# Patient Record
Sex: MALE | Race: WHITE | NOT HISPANIC OR LATINO | URBAN - METROPOLITAN AREA
[De-identification: names, ages, dates, MRNs, and addresses within clinical notes are randomized per-mention and may not be internally consistent; named-entity substitution may affect disease eponyms.]

---

## 2017-01-07 ENCOUNTER — EMERGENCY (EMERGENCY)
Facility: HOSPITAL | Age: 32
LOS: 1 days | Discharge: HOME | End: 2017-01-07
Admitting: EMERGENCY MEDICINE
Payer: COMMERCIAL

## 2017-01-07 PROCEDURE — 99283 EMERGENCY DEPT VISIT LOW MDM: CPT | Mod: 25

## 2017-01-07 PROCEDURE — 99283 EMERGENCY DEPT VISIT LOW MDM: CPT

## 2017-01-07 PROCEDURE — 72040 X-RAY EXAM NECK SPINE 2-3 VW: CPT

## 2017-01-07 PROCEDURE — 72040 X-RAY EXAM NECK SPINE 2-3 VW: CPT | Mod: 26

## 2017-01-07 RX ORDER — METHOCARBAMOL 500 MG/1
2 TABLET, FILM COATED ORAL
Qty: 30 | Refills: 0 | OUTPATIENT
Start: 2017-01-07 | End: 2017-01-12

## 2017-03-28 ENCOUNTER — APPOINTMENT (OUTPATIENT)
Dept: GASTROENTEROLOGY | Facility: CLINIC | Age: 32
End: 2017-03-28

## 2017-03-28 VITALS
TEMPERATURE: 98 F | BODY MASS INDEX: 24.48 KG/M2 | HEART RATE: 68 BPM | SYSTOLIC BLOOD PRESSURE: 112 MMHG | DIASTOLIC BLOOD PRESSURE: 80 MMHG | OXYGEN SATURATION: 99 % | HEIGHT: 67 IN | RESPIRATION RATE: 15 BRPM | WEIGHT: 156 LBS

## 2017-03-30 LAB
25(OH)D3 SERPL-MCNC: 36.2 NG/ML
ALBUMIN SERPL ELPH-MCNC: 4.1 G/DL
ALP BLD-CCNC: 59 U/L
ALT SERPL-CCNC: 38 U/L
ANION GAP SERPL CALC-SCNC: 18 MMOL/L
AST SERPL-CCNC: 43 U/L
BASOPHILS # BLD AUTO: 0.06 K/UL
BASOPHILS NFR BLD AUTO: 0.5 %
BILIRUB SERPL-MCNC: <0.2 MG/DL
BUN SERPL-MCNC: 37 MG/DL
CALCIUM SERPL-MCNC: 9.4 MG/DL
CHLORIDE SERPL-SCNC: 101 MMOL/L
CO2 SERPL-SCNC: 21 MMOL/L
CREAT SERPL-MCNC: 1.18 MG/DL
CRP SERPL-MCNC: 0.2 MG/DL
EOSINOPHIL # BLD AUTO: 0.46 K/UL
EOSINOPHIL NFR BLD AUTO: 4.2 %
GLUCOSE SERPL-MCNC: 87 MG/DL
HCT VFR BLD CALC: 40.4 %
HGB BLD-MCNC: 13.5 G/DL
IMM GRANULOCYTES NFR BLD AUTO: 0.1 %
LYMPHOCYTES # BLD AUTO: 2.01 K/UL
LYMPHOCYTES NFR BLD AUTO: 18.4 %
MAN DIFF?: NORMAL
MCHC RBC-ENTMCNC: 30 PG
MCHC RBC-ENTMCNC: 33.4 GM/DL
MCV RBC AUTO: 89.8 FL
MONOCYTES # BLD AUTO: 0.44 K/UL
MONOCYTES NFR BLD AUTO: 4 %
NEUTROPHILS # BLD AUTO: 7.95 K/UL
NEUTROPHILS NFR BLD AUTO: 72.8 %
PLATELET # BLD AUTO: 249 K/UL
POTASSIUM SERPL-SCNC: 4.3 MMOL/L
PROT SERPL-MCNC: 7.1 G/DL
RBC # BLD: 4.5 M/UL
RBC # FLD: 13.3 %
SODIUM SERPL-SCNC: 140 MMOL/L
WBC # FLD AUTO: 10.93 K/UL

## 2017-04-05 ENCOUNTER — MESSAGE (OUTPATIENT)
Age: 32
End: 2017-04-05

## 2017-04-05 ENCOUNTER — APPOINTMENT (OUTPATIENT)
Dept: CARDIOLOGY | Facility: CLINIC | Age: 32
End: 2017-04-05

## 2017-04-05 ENCOUNTER — NON-APPOINTMENT (OUTPATIENT)
Age: 32
End: 2017-04-05

## 2017-04-05 VITALS
DIASTOLIC BLOOD PRESSURE: 76 MMHG | BODY MASS INDEX: 24.23 KG/M2 | WEIGHT: 154.4 LBS | OXYGEN SATURATION: 97 % | HEIGHT: 67 IN | HEART RATE: 57 BPM | SYSTOLIC BLOOD PRESSURE: 125 MMHG | TEMPERATURE: 98.3 F

## 2017-04-05 DIAGNOSIS — K62.5 HEMORRHAGE OF ANUS AND RECTUM: ICD-10-CM

## 2017-04-05 DIAGNOSIS — R74.8 ABNORMAL LEVELS OF OTHER SERUM ENZYMES: ICD-10-CM

## 2017-04-06 LAB
C DIFF TOX GENS STL QL NAA+PROBE: NORMAL
CDIFF BY PCR: NOT DETECTED

## 2017-04-07 LAB — DEPRECATED O AND P PREP STL: NORMAL

## 2017-06-22 ENCOUNTER — APPOINTMENT (OUTPATIENT)
Dept: CARDIOLOGY | Facility: CLINIC | Age: 32
End: 2017-06-22

## 2017-06-22 ENCOUNTER — NON-APPOINTMENT (OUTPATIENT)
Age: 32
End: 2017-06-22

## 2017-06-22 VITALS
SYSTOLIC BLOOD PRESSURE: 113 MMHG | BODY MASS INDEX: 25.43 KG/M2 | WEIGHT: 162 LBS | HEIGHT: 67 IN | HEART RATE: 63 BPM | DIASTOLIC BLOOD PRESSURE: 64 MMHG | OXYGEN SATURATION: 96 %

## 2017-06-22 DIAGNOSIS — R05 COUGH: ICD-10-CM

## 2017-06-22 DIAGNOSIS — R06.2 WHEEZING: ICD-10-CM

## 2017-07-05 ENCOUNTER — OUTPATIENT (OUTPATIENT)
Dept: OUTPATIENT SERVICES | Facility: HOSPITAL | Age: 32
LOS: 1 days | End: 2017-07-05
Payer: COMMERCIAL

## 2017-07-05 ENCOUNTER — APPOINTMENT (OUTPATIENT)
Dept: RADIOLOGY | Facility: IMAGING CENTER | Age: 32
End: 2017-07-05

## 2017-07-05 ENCOUNTER — MESSAGE (OUTPATIENT)
Age: 32
End: 2017-07-05

## 2017-07-05 DIAGNOSIS — Z00.8 ENCOUNTER FOR OTHER GENERAL EXAMINATION: ICD-10-CM

## 2017-07-05 PROCEDURE — 71046 X-RAY EXAM CHEST 2 VIEWS: CPT

## 2017-07-06 ENCOUNTER — MESSAGE (OUTPATIENT)
Age: 32
End: 2017-07-06

## 2017-07-13 ENCOUNTER — APPOINTMENT (OUTPATIENT)
Dept: GASTROENTEROLOGY | Facility: HOSPITAL | Age: 32
End: 2017-07-13

## 2017-07-13 ENCOUNTER — OUTPATIENT (OUTPATIENT)
Dept: OUTPATIENT SERVICES | Facility: HOSPITAL | Age: 32
LOS: 1 days | Discharge: HOME | End: 2017-07-13
Payer: COMMERCIAL

## 2017-07-13 ENCOUNTER — RESULT REVIEW (OUTPATIENT)
Age: 32
End: 2017-07-13

## 2017-07-13 DIAGNOSIS — K51.90 ULCERATIVE COLITIS, UNSPECIFIED, WITHOUT COMPLICATIONS: ICD-10-CM

## 2017-07-13 PROCEDURE — 45380 COLONOSCOPY AND BIOPSY: CPT | Mod: GC

## 2017-07-13 PROCEDURE — 88305 TISSUE EXAM BY PATHOLOGIST: CPT | Mod: 26

## 2017-07-17 LAB — SURGICAL PATHOLOGY STUDY: SIGNIFICANT CHANGE UP

## 2017-08-15 ENCOUNTER — MEDICATION RENEWAL (OUTPATIENT)
Age: 32
End: 2017-08-15

## 2017-08-15 RX ORDER — POLYETHYLENE GLYCOL 3350, SODIUM SULFATE, SODIUM CHLORIDE, POTASSIUM CHLORIDE, ASCORBIC ACID, SODIUM ASCORBATE 7.5-2.691G
100 KIT ORAL
Qty: 1 | Refills: 0 | Status: COMPLETED | COMMUNITY
Start: 2017-05-05 | End: 2017-08-15

## 2017-08-21 ENCOUNTER — RX RENEWAL (OUTPATIENT)
Age: 32
End: 2017-08-21

## 2017-09-21 ENCOUNTER — APPOINTMENT (OUTPATIENT)
Dept: CARDIOLOGY | Facility: CLINIC | Age: 32
End: 2017-09-21

## 2017-09-21 RX ORDER — CALCIPOTRIENE 50 UG/G
0.01 CREAM TOPICAL
Qty: 120 | Refills: 0 | Status: ACTIVE | COMMUNITY
Start: 2017-07-24

## 2017-09-21 RX ORDER — HALOBETASOL PROPIONATE 0.5 MG/G
0.05 CREAM TOPICAL
Qty: 50 | Refills: 0 | Status: ACTIVE | COMMUNITY
Start: 2017-07-24

## 2017-11-08 ENCOUNTER — OTHER (OUTPATIENT)
Age: 32
End: 2017-11-08

## 2017-11-08 ENCOUNTER — MESSAGE (OUTPATIENT)
Age: 32
End: 2017-11-08

## 2017-11-12 ENCOUNTER — MOBILE ON CALL (OUTPATIENT)
Age: 32
End: 2017-11-12

## 2017-11-14 LAB
25(OH)D3 SERPL-MCNC: 35.1 NG/ML
ALBUMIN SERPL ELPH-MCNC: 4.3 G/DL
ALP BLD-CCNC: 68 U/L
ALT SERPL-CCNC: 25 U/L
ANION GAP SERPL CALC-SCNC: 15 MMOL/L
AST SERPL-CCNC: 39 U/L
BACTERIA STL CULT: NORMAL
BASOPHILS # BLD AUTO: 0.06 K/UL
BASOPHILS NFR BLD AUTO: 0.7 %
BILIRUB SERPL-MCNC: 0.3 MG/DL
BUN SERPL-MCNC: 38 MG/DL
CALCIUM SERPL-MCNC: 9.6 MG/DL
CHLORIDE SERPL-SCNC: 96 MMOL/L
CO2 SERPL-SCNC: 22 MMOL/L
CREAT SERPL-MCNC: 1.3 MG/DL
CRP SERPL-MCNC: 0.6 MG/DL
DEPRECATED O AND P PREP STL: NORMAL
EOSINOPHIL # BLD AUTO: 0.15 K/UL
EOSINOPHIL NFR BLD AUTO: 1.7 %
HCT VFR BLD CALC: 41.4 %
HGB BLD-MCNC: 13.8 G/DL
IMM GRANULOCYTES NFR BLD AUTO: 0.1 %
LYMPHOCYTES # BLD AUTO: 1.38 K/UL
LYMPHOCYTES NFR BLD AUTO: 15.2 %
MAN DIFF?: NORMAL
MCHC RBC-ENTMCNC: 28.5 PG
MCHC RBC-ENTMCNC: 33.3 GM/DL
MCV RBC AUTO: 85.5 FL
MONOCYTES # BLD AUTO: 0.75 K/UL
MONOCYTES NFR BLD AUTO: 8.3 %
NEUTROPHILS # BLD AUTO: 6.71 K/UL
NEUTROPHILS NFR BLD AUTO: 74 %
PLATELET # BLD AUTO: 237 K/UL
POTASSIUM SERPL-SCNC: 3.7 MMOL/L
PROT SERPL-MCNC: 7.5 G/DL
RBC # BLD: 4.84 M/UL
RBC # FLD: 14.1 %
SODIUM SERPL-SCNC: 133 MMOL/L
WBC # FLD AUTO: 9.06 K/UL

## 2017-11-17 LAB — CALPROTECTIN FECAL: 98 UG/G

## 2017-11-18 LAB
C DIFF TOX GENS STL QL NAA+PROBE: NORMAL
CDIFF BY PCR: NOT DETECTED

## 2018-05-22 ENCOUNTER — RX RENEWAL (OUTPATIENT)
Age: 33
End: 2018-05-22

## 2018-05-30 ENCOUNTER — MEDICATION RENEWAL (OUTPATIENT)
Age: 33
End: 2018-05-30

## 2018-05-31 ENCOUNTER — MESSAGE (OUTPATIENT)
Age: 33
End: 2018-05-31

## 2018-06-29 ENCOUNTER — LABORATORY RESULT (OUTPATIENT)
Age: 33
End: 2018-06-29

## 2018-06-29 ENCOUNTER — APPOINTMENT (OUTPATIENT)
Dept: GASTROENTEROLOGY | Facility: CLINIC | Age: 33
End: 2018-06-29
Payer: COMMERCIAL

## 2018-06-29 VITALS
WEIGHT: 160 LBS | OXYGEN SATURATION: 99 % | HEART RATE: 63 BPM | BODY MASS INDEX: 25.11 KG/M2 | DIASTOLIC BLOOD PRESSURE: 82 MMHG | HEIGHT: 67 IN | SYSTOLIC BLOOD PRESSURE: 128 MMHG

## 2018-06-29 PROCEDURE — 99214 OFFICE O/P EST MOD 30 MIN: CPT | Mod: 25

## 2018-06-29 PROCEDURE — 36415 COLL VENOUS BLD VENIPUNCTURE: CPT

## 2018-07-02 ENCOUNTER — APPOINTMENT (OUTPATIENT)
Dept: PULMONOLOGY | Facility: CLINIC | Age: 33
End: 2018-07-02
Payer: COMMERCIAL

## 2018-07-02 VITALS
BODY MASS INDEX: 25.11 KG/M2 | HEIGHT: 67 IN | DIASTOLIC BLOOD PRESSURE: 58 MMHG | HEART RATE: 58 BPM | SYSTOLIC BLOOD PRESSURE: 100 MMHG | OXYGEN SATURATION: 97 % | WEIGHT: 160 LBS

## 2018-07-02 PROCEDURE — 99213 OFFICE O/P EST LOW 20 MIN: CPT | Mod: 25

## 2018-07-02 PROCEDURE — 94060 EVALUATION OF WHEEZING: CPT

## 2018-07-02 RX ORDER — FLUTICASONE PROPIONATE 50 UG/1
50 SPRAY, METERED NASAL DAILY
Qty: 1 | Refills: 3 | Status: ACTIVE | COMMUNITY
Start: 2018-07-02 | End: 1900-01-01

## 2018-07-06 LAB
25(OH)D3 SERPL-MCNC: 36.8 NG/ML
ALBUMIN SERPL ELPH-MCNC: 4.2 G/DL
ALP BLD-CCNC: 73 U/L
ALT SERPL-CCNC: 19 U/L
ANION GAP SERPL CALC-SCNC: 13 MMOL/L
AST SERPL-CCNC: 29 U/L
BASOPHILS # BLD AUTO: 0.1 K/UL
BASOPHILS NFR BLD AUTO: 1.3 %
BILIRUB SERPL-MCNC: 0.2 MG/DL
BUN SERPL-MCNC: 35 MG/DL
CALCIUM SERPL-MCNC: 9.4 MG/DL
CHLORIDE SERPL-SCNC: 103 MMOL/L
CO2 SERPL-SCNC: 23 MMOL/L
CREAT SERPL-MCNC: 1.18 MG/DL
CRP SERPL-MCNC: 0.7 MG/DL
EOSINOPHIL # BLD AUTO: 0.34 K/UL
EOSINOPHIL NFR BLD AUTO: 4.4 %
HCT VFR BLD CALC: 38.5 %
HGB BLD-MCNC: 12.3 G/DL
IMM GRANULOCYTES NFR BLD AUTO: 0.1 %
LYMPHOCYTES # BLD AUTO: 1.38 K/UL
LYMPHOCYTES NFR BLD AUTO: 18 %
MAN DIFF?: NORMAL
MCHC RBC-ENTMCNC: 27 PG
MCHC RBC-ENTMCNC: 31.9 GM/DL
MCV RBC AUTO: 84.6 FL
MONOCYTES # BLD AUTO: 0.68 K/UL
MONOCYTES NFR BLD AUTO: 8.9 %
NEUTROPHILS # BLD AUTO: 5.17 K/UL
NEUTROPHILS NFR BLD AUTO: 67.3 %
PLATELET # BLD AUTO: 328 K/UL
POTASSIUM SERPL-SCNC: 4.5 MMOL/L
PROT SERPL-MCNC: 7.5 G/DL
RBC # BLD: 4.55 M/UL
RBC # FLD: 14.4 %
SODIUM SERPL-SCNC: 139 MMOL/L
WBC # FLD AUTO: 7.68 K/UL

## 2018-08-05 ENCOUNTER — RX RENEWAL (OUTPATIENT)
Age: 33
End: 2018-08-05

## 2018-10-15 ENCOUNTER — APPOINTMENT (OUTPATIENT)
Dept: CARDIOLOGY | Facility: CLINIC | Age: 33
End: 2018-10-15
Payer: COMMERCIAL

## 2018-10-15 ENCOUNTER — NON-APPOINTMENT (OUTPATIENT)
Age: 33
End: 2018-10-15

## 2018-10-15 VITALS
HEIGHT: 67 IN | OXYGEN SATURATION: 98 % | SYSTOLIC BLOOD PRESSURE: 133 MMHG | DIASTOLIC BLOOD PRESSURE: 80 MMHG | WEIGHT: 156 LBS | HEART RATE: 65 BPM | BODY MASS INDEX: 24.48 KG/M2

## 2018-10-15 DIAGNOSIS — R09.82 POSTNASAL DRIP: ICD-10-CM

## 2018-10-15 PROCEDURE — 90471 IMMUNIZATION ADMIN: CPT

## 2018-10-15 PROCEDURE — 90682 RIV4 VACC RECOMBINANT DNA IM: CPT

## 2018-10-15 PROCEDURE — 99395 PREV VISIT EST AGE 18-39: CPT | Mod: 25

## 2018-10-17 LAB
25(OH)D3 SERPL-MCNC: 36.6 NG/ML
ALBUMIN SERPL ELPH-MCNC: 4.8 G/DL
ALP BLD-CCNC: 81 U/L
ALT SERPL-CCNC: 28 U/L
ANION GAP SERPL CALC-SCNC: 23 MMOL/L
AST SERPL-CCNC: 37 U/L
BASOPHILS # BLD AUTO: 0.04 K/UL
BASOPHILS NFR BLD AUTO: 0.5 %
BILIRUB SERPL-MCNC: 0.3 MG/DL
BUN SERPL-MCNC: 38 MG/DL
CALCIUM SERPL-MCNC: 9.9 MG/DL
CHLORIDE SERPL-SCNC: 100 MMOL/L
CHOLEST SERPL-MCNC: 258 MG/DL
CHOLEST/HDLC SERPL: 3.7 RATIO
CO2 SERPL-SCNC: 20 MMOL/L
CREAT SERPL-MCNC: 1.18 MG/DL
EOSINOPHIL # BLD AUTO: 0.05 K/UL
EOSINOPHIL NFR BLD AUTO: 0.6 %
ESTIMATED AVERAGE GLUCOSE: 120 MG/DL
FERRITIN SERPL-MCNC: 18 NG/ML
GLUCOSE SERPL-MCNC: 93 MG/DL
HBA1C MFR BLD HPLC: 5.8 %
HCT VFR BLD CALC: 46 %
HDLC SERPL-MCNC: 70 MG/DL
HGB BLD-MCNC: 14.7 G/DL
IMM GRANULOCYTES NFR BLD AUTO: 0.1 %
IRON SATN MFR SERPL: 24 %
IRON SERPL-MCNC: 86 UG/DL
LDLC SERPL CALC-MCNC: 172 MG/DL
LYMPHOCYTES # BLD AUTO: 1.35 K/UL
LYMPHOCYTES NFR BLD AUTO: 16.6 %
MAN DIFF?: NORMAL
MCHC RBC-ENTMCNC: 26.5 PG
MCHC RBC-ENTMCNC: 32 GM/DL
MCV RBC AUTO: 82.9 FL
MONOCYTES # BLD AUTO: 0.42 K/UL
MONOCYTES NFR BLD AUTO: 5.2 %
NEUTROPHILS # BLD AUTO: 6.24 K/UL
NEUTROPHILS NFR BLD AUTO: 77 %
PLATELET # BLD AUTO: 277 K/UL
POTASSIUM SERPL-SCNC: 4.3 MMOL/L
PROT SERPL-MCNC: 8.4 G/DL
RBC # BLD: 5.55 M/UL
RBC # FLD: 15.1 %
SODIUM SERPL-SCNC: 143 MMOL/L
T4 SERPL-MCNC: 7.3 UG/DL
TIBC SERPL-MCNC: 365 UG/DL
TRIGL SERPL-MCNC: 80 MG/DL
TSH SERPL-ACNC: 1.19 UIU/ML
UIBC SERPL-MCNC: 279 UG/DL
WBC # FLD AUTO: 8.11 K/UL

## 2018-12-27 ENCOUNTER — MEDICATION RENEWAL (OUTPATIENT)
Age: 33
End: 2018-12-27

## 2018-12-27 ENCOUNTER — OTHER (OUTPATIENT)
Age: 33
End: 2018-12-27

## 2019-01-02 ENCOUNTER — APPOINTMENT (OUTPATIENT)
Dept: GASTROENTEROLOGY | Facility: CLINIC | Age: 34
End: 2019-01-02

## 2019-01-02 ENCOUNTER — OTHER (OUTPATIENT)
Age: 34
End: 2019-01-02

## 2019-06-20 ENCOUNTER — TRANSCRIPTION ENCOUNTER (OUTPATIENT)
Age: 34
End: 2019-06-20

## 2019-08-15 ENCOUNTER — MEDICATION RENEWAL (OUTPATIENT)
Age: 34
End: 2019-08-15

## 2019-08-15 RX ORDER — MESALAMINE 1000 MG/1
1000 SUPPOSITORY RECTAL
Qty: 90 | Refills: 3 | Status: COMPLETED | COMMUNITY
Start: 2017-03-28 | End: 2019-08-15

## 2019-10-16 ENCOUNTER — APPOINTMENT (OUTPATIENT)
Dept: GASTROENTEROLOGY | Facility: CLINIC | Age: 34
End: 2019-10-16
Payer: COMMERCIAL

## 2019-10-16 VITALS
BODY MASS INDEX: 25.11 KG/M2 | HEIGHT: 67 IN | HEART RATE: 57 BPM | WEIGHT: 160 LBS | OXYGEN SATURATION: 89 % | SYSTOLIC BLOOD PRESSURE: 130 MMHG | DIASTOLIC BLOOD PRESSURE: 82 MMHG

## 2019-10-16 PROCEDURE — 36415 COLL VENOUS BLD VENIPUNCTURE: CPT

## 2019-10-16 PROCEDURE — 99214 OFFICE O/P EST MOD 30 MIN: CPT | Mod: 25

## 2019-10-16 NOTE — HISTORY OF PRESENT ILLNESS
[FreeTextEntry1] : Office revisit on 10/16/19.\par \par Previously evaluated for office consultation on 8/3/16.\par \par INITIAL CONSULTATION NOTE:\par \par The patient is a 31-year-old male referred for ongoing treatment of ulcerative colitis. The patient's wife was in attendance throughout the interview and examination. The history was from the patient and a review of limited records that were provided. PMD: Dr. Parish Willingham.\par \par Patient indicates onset of GI symptoms in approximately 2009. Experienced complaints of bloating, cramps, diarrhea and abdominal discomfort. Reports undergoing a colonoscopy in 2009 at [facility] by Dr. Steve Dacosta. He reports this diagnostic evaluation was negative. He indicates being evaluated for celiac disease with apparent negative results. He does not recall if he underwent a colonoscopy.\par \par Several years ago he underwent evaluation by Dr. Parish Holden of colorectal surgery. It is not clear if a sigmoidoscopy was performed. The patient was experiencing rectal bleeding at that time. The patient indicates being diagnosed with possibly hemorrhoids or an anal fissure.\par \par The patient moved to [place] in 8/2015. His symptoms of abdominal gaseous discomfort, diarrhea and rectal bleeding persisted. He was experiencing approximately 6-7 bowel movements especially in the morning. Some movements were small volume and just passage of blood. He was ultimately evaluated by the [facility] in [place]. He was evaluated initially by colorectal surgery and subsequently by gastroenterology.\par \par A colonoscopy was performed by Dr. Raheel Phillips on 9/17/15 revealing localized mild inflammation in the rectum. Examination was otherwise normal. The pathology report regarding this is not available.\par \par Flexible sigmoidoscopy was performed on 12/15/15 revealing inflammatory changes in the rectum. Proctitis was diagnosed.\par \par In 12/2015 the patient was hospitalized for one day at the [facility] facility in [place]. He indicates being diagnosed with ulcerative colitis. 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 Balsalazide 3 tabs p.o. t.i.d. was prescribed. Of note, the patient misunderstood the directions and only took one tab t.i.d. Canasa suppository was prescribed for one to 2 months. He was treated by Dr. April Villalpando of gastroenterology.\par \par Overall, the patient derived a significant improvement in symptoms. However, it is not clear if all of his symptoms resolved and whether he was in a complete remission.\par \par The patient stopped all of the treatment on his own some time in 12/2015 or early 1/2016. He moved back to [place].\par \par In 6/2016 he describes a flare. Diarrhea, abdominal cramps, abdominal gaseous discomfort,bloating and rectal bleeding recurred. Indicates up to 20 "bowel movements" a day although he was experiencing tenesmus and some of these were just small evacuations of blood. There was no fever, nausea or vomiting. He lost 6 pounds.\par \par The patient resumed  balsalazide one tab t.i.d. on his own. He did not resume rectal therapy. He indicates a significant improvement. However, he remains somewhat symptomatic. He has approximately 4-5 bowel movements daily that very in consistency. Occasionally has loose stools. Can still observe some blood.\par \par The patient's appetite and weight are stable. He denies any current complaints of fever, oral ulcers, dysphagia, heartburn, nausea, vomiting or abdominal pain.\par \par The patient denies any history of gallstones or kidney stones.\par \par The patient has a history of psoriasis. However, he reports no symptoms suggestive of skin, joint or ocular extraintestinal manifestations. However, he can experience occasional hip pain.\par \par The patient exercises a lot. He can experience a sense of straining at the umbilicus. However, there is no bulge or obvious hernia. He describes a "tugging feeling" at the umbilicus with a pulling sensation.\par \par The patient indicates on a few occasions he was found to have elevated liver enzymes. He reports no etiology was determined. He indicates this was transient on 2 occasions with subsequent normalization.\par \par CURRENT HISTORY:\par \par ORV 3/28/17:        -Had been doing quite well on regimen of balsalazide 4 tabs p.o. b.i.d. in conjunction with Canasa 1 g daily.\par                 -Change in insurance 1/2017 resulted in discontinuation of Canasa. After one week he experienced flare of colitis-like symptoms. Currently having 4-5 bowel movements daily of varying consistency. Some loose and some formed. Occasional blood observed.\par                -Denies fever, nausea, vomiting or abdominal pain. Appetite fair. Has lost 4 pounds.\par                -Patient still utilizing balsalazide 4 tabs p.o. b.i.d.\par                -Has occasional headaches.\par                 -Increased stress. He has had to move.\par                 -Other than Canasa discontinuation patient reports no other precipitating factors such as antibiotics, travel, diet changes etc.\par \par COLONOSCOPY 7/13/17:    -Total colonoscopy was performed to the cecum with ileoscopy on 7/13/17. Macroscopically the colon was normal except for minimal erythema in the rectum and sigmoid colon. There was no ulcerations, friability or other features of active colitis. Vascular pattern was  preserved. Biopsy of the ascending colon was noted for nonspecific inflammation in the lamina propria. Biopsies of the transverse colon, descending colon, sigmoid colon and rectum revealed colonic mucosa with chronic inflammatory changes characterized by crypt distortion and basal lymphoplasmacytosis and focal activity. Dysplasia was not detected.\par \par ORV 6/29/18:    -Overall, doing well from the standpoint of ulcerative proctitis over the past year. For the most part has been asymptomatic and in remission. However, patient indicates that 3 weeks ago he experienced symptoms lasting one week. For several days he described increased frequency of moving his bowels with up to 12 diarrheal evacuations daily with some blood. Patient suspects dietary factors with increased high-fiber foods may have been provocative. No sick contacts, travel, antibiotics or other precipitating factors reported. Medication regimen was not adjusted during this interval. Symptoms lasted approximately one week and he has since recovered.\par                -Patient currently has 2 formed bowel movements daily without complaints of diarrhea or constipation. Significantly feels better from approximately 3 weeks ago. Might notice some scant blood. However, much improved. Denies fever, nausea, vomiting or abdominal pain.\par               -Medications include balsalazide 4 tabs b.i.d. and Canasa suppository 1 g h.s.\par \par ORV 10/16/19:     -Patient presents for followup regarding history of ulcerative proctosigmoiditis. Dominant clinical features have been mostly ulcerative proctitis. Currently doing well on regimen of balsalazide 4 tabs p.o. b.i.d. in conjunction with mesalamine enema 1 g h.s. Typically has 2 formed bowel movements in the morning and denies current complaints of diarrhea, constipation, change in bowel habit or rectal bleeding. On occasion can have mild constipation.

## 2019-10-16 NOTE — CONSULT LETTER
[Dear  ___] : Dear  [unfilled], [Courtesy Letter:] : I had the pleasure of seeing your patient, [unfilled], in my office today. [Please see my note below.] : Please see my note below. [Consult Closing:] : Thank you very much for allowing me to participate in the care of this patient.  If you have any questions, please do not hesitate to contact me. [Sincerely,] : Sincerely, [Rashad Fernandez M.D.] : Rashad Fernandez M.D. [Division of Gastroenterology] : Division of Gastroenterology [[facility]] : [facility] [[address] : [address] [[place] [address]] : [place] [address] [FreeTextEntry2] : Dr. Parish Willingham [FreeTextEntry3] : Rashad Fernandez M.D.

## 2019-10-16 NOTE — PHYSICAL EXAM
[General Appearance - Alert] : alert [General Appearance - In No Acute Distress] : in no acute distress [General Appearance - Well Nourished] : well nourished [General Appearance - Well Developed] : well developed [General Appearance - Well-Appearing] : healthy appearing [Sclera] : the sclera and conjunctiva were normal [Oropharynx] : the oropharynx was normal [Neck Appearance] : the appearance of the neck was normal [Neck Cervical Mass (___cm)] : no neck mass was observed [Respiration, Rhythm And Depth] : normal respiratory rhythm and effort [Exaggerated Use Of Accessory Muscles For Inspiration] : no accessory muscle use [Auscultation Breath Sounds / Voice Sounds] : lungs were clear to auscultation bilaterally [Heart Rate And Rhythm] : heart rate was normal and rhythm regular [Heart Sounds] : normal S1 and S2 [Heart Sounds Gallop] : no gallops [Murmurs] : no murmurs [Heart Sounds Pericardial Friction Rub] : no pericardial rub [Edema] : there was no peripheral edema [Bowel Sounds] : normal bowel sounds [Abdomen Soft] : soft [Abdomen Tenderness] : non-tender [] : no hepato-splenomegaly [Abdomen Mass (___ Cm)] : no abdominal mass palpated [Abdomen Hernia] : no hernia was discovered [Cervical Lymph Nodes Enlarged Posterior Bilaterally] : posterior cervical [Cervical Lymph Nodes Enlarged Anterior Bilaterally] : anterior cervical [Supraclavicular Lymph Nodes Enlarged Bilaterally] : supraclavicular [Abnormal Walk] : normal gait [Nail Clubbing] : no clubbing  or cyanosis of the fingernails [Skin Color & Pigmentation] : normal skin color and pigmentation [Oriented To Time, Place, And Person] : oriented to person, place, and time [Impaired Insight] : insight and judgment were intact [Affect] : the affect was normal [Mood] : the mood was normal [FreeTextEntry1] : Pectus excavatum is noted.

## 2019-10-16 NOTE — ASSESSMENT
[FreeTextEntry1] : Impression:\par \par #1 ulcerative proctitis- overall doing well on combination therapy with balsalazide 4 tabs p.o. b.i.d. and mesalamine suppository 1 g q.d. Currently stable and in clinical remission.\par \par #2 status post RIH repair\par \par #3 psoriasis.

## 2019-10-22 LAB
ALBUMIN SERPL ELPH-MCNC: 4.9 G/DL
ALP BLD-CCNC: 83 U/L
ALT SERPL-CCNC: 26 U/L
ANION GAP SERPL CALC-SCNC: 16 MMOL/L
AST SERPL-CCNC: 36 U/L
BASOPHILS # BLD AUTO: 0.09 K/UL
BASOPHILS NFR BLD AUTO: 1.2 %
BILIRUB SERPL-MCNC: 0.3 MG/DL
BUN SERPL-MCNC: 27 MG/DL
CALCIUM SERPL-MCNC: 9.8 MG/DL
CHLORIDE SERPL-SCNC: 103 MMOL/L
CO2 SERPL-SCNC: 22 MMOL/L
CREAT SERPL-MCNC: 0.98 MG/DL
EOSINOPHIL # BLD AUTO: 0.16 K/UL
EOSINOPHIL NFR BLD AUTO: 2.1 %
ERYTHROCYTE [SEDIMENTATION RATE] IN BLOOD BY WESTERGREN METHOD: 30 MM/HR
FERRITIN SERPL-MCNC: 30 NG/ML
HCT VFR BLD CALC: 43.6 %
HGB BLD-MCNC: 13.7 G/DL
IMM GRANULOCYTES NFR BLD AUTO: 0.3 %
IRON SATN MFR SERPL: 21 %
IRON SERPL-MCNC: 79 UG/DL
LYMPHOCYTES # BLD AUTO: 1.8 K/UL
LYMPHOCYTES NFR BLD AUTO: 23.8 %
MAN DIFF?: NORMAL
MCHC RBC-ENTMCNC: 27.8 PG
MCHC RBC-ENTMCNC: 31.4 GM/DL
MCV RBC AUTO: 88.6 FL
MONOCYTES # BLD AUTO: 0.53 K/UL
MONOCYTES NFR BLD AUTO: 7 %
NEUTROPHILS # BLD AUTO: 4.95 K/UL
NEUTROPHILS NFR BLD AUTO: 65.6 %
PLATELET # BLD AUTO: 303 K/UL
POTASSIUM SERPL-SCNC: 4.3 MMOL/L
PROT SERPL-MCNC: 7.8 G/DL
RBC # BLD: 4.92 M/UL
RBC # FLD: 13.2 %
SODIUM SERPL-SCNC: 141 MMOL/L
TIBC SERPL-MCNC: 368 UG/DL
UIBC SERPL-MCNC: 289 UG/DL
WBC # FLD AUTO: 7.55 K/UL

## 2020-04-15 ENCOUNTER — APPOINTMENT (OUTPATIENT)
Dept: GASTROENTEROLOGY | Facility: CLINIC | Age: 35
End: 2020-04-15

## 2020-04-20 ENCOUNTER — APPOINTMENT (OUTPATIENT)
Dept: GASTROENTEROLOGY | Facility: CLINIC | Age: 35
End: 2020-04-20
Payer: COMMERCIAL

## 2020-04-20 PROCEDURE — 99213 OFFICE O/P EST LOW 20 MIN: CPT | Mod: 95

## 2020-04-20 NOTE — HISTORY OF PRESENT ILLNESS
[Medical Office: ([facility])___] : at the medical office located in  [Home] : at home, [unfilled] , at the time of the visit. [Patient] : the patient [Self] : self [FreeTextEntry1] : Telehealth visit on 4/20/2020.\par \par Patient presents for followup regarding history of ulcerative colitis.\par \par Previously evaluated for office consultation on 8/3/16.\par \par INITIAL CONSULTATION NOTE:\par \par The patient is a 31-year-old male referred for ongoing treatment of ulcerative colitis. The patient's wife was in attendance throughout the interview and examination. The history was from the patient and a review of limited records that were provided. PMD: Dr. Parish Willingham.\par \par Patient indicates onset of GI symptoms in approximately 2009. Experienced complaints of bloating, cramps, diarrhea and abdominal discomfort. Reports undergoing a colonoscopy in 2009 at [facility] by Dr. Steve Dacosta. He reports this diagnostic evaluation was negative. He indicates being evaluated for celiac disease with apparent negative results. He does not recall if he underwent a colonoscopy.\par \par Several years ago he underwent evaluation by Dr. Parish Holden of colorectal surgery. It is not clear if a sigmoidoscopy was performed. The patient was experiencing rectal bleeding at that time. The patient indicates being diagnosed with possibly hemorrhoids or an anal fissure.\par \par The patient moved to [place] in 8/2015. His symptoms of abdominal gaseous discomfort, diarrhea and rectal bleeding persisted. He was experiencing approximately 6-7 bowel movements especially in the morning. Some movements were small volume and just passage of blood. He was ultimately evaluated by the [facility] in [place]. He was evaluated initially by colorectal surgery and subsequently by gastroenterology.\par \par A colonoscopy was performed by Dr. Raheel Phillips on 9/17/15 revealing localized mild inflammation in the rectum. Examination was otherwise normal. The pathology report regarding this is not available.\par \par Flexible sigmoidoscopy was performed on 12/15/15 revealing inflammatory changes in the rectum. Proctitis was diagnosed.\par \par In 12/2015 the patient was hospitalized for one day at the [facility] facility in [place]. He indicates being diagnosed with ulcerative colitis. 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 Balsalazide 3 tabs p.o. t.i.d. was prescribed. Of note, the patient misunderstood the directions and only took one tab t.i.d. Canasa suppository was prescribed for one to 2 months. He was treated by Dr. April Villalpando of gastroenterology.\par \par Overall, the patient derived a significant improvement in symptoms. However, it is not clear if all of his symptoms resolved and whether he was in a complete remission.\par \par The patient stopped all of the treatment on his own some time in 12/2015 or early 1/2016. He moved back to [place].\par \par In 6/2016 he describes a flare. Diarrhea, abdominal cramps, abdominal gaseous discomfort,bloating and rectal bleeding recurred. Indicates up to 20 "bowel movements" a day although he was experiencing tenesmus and some of these were just small evacuations of blood. There was no fever, nausea or vomiting. He lost 6 pounds.\par \par The patient resumed  balsalazide one tab t.i.d. on his own. He did not resume rectal therapy. He indicates a significant improvement. However, he remains somewhat symptomatic. He has approximately 4-5 bowel movements daily that very in consistency. Occasionally has loose stools. Can still observe some blood.\par \par The patient's appetite and weight are stable. He denies any current complaints of fever, oral ulcers, dysphagia, heartburn, nausea, vomiting or abdominal pain.\par \par The patient denies any history of gallstones or kidney stones.\par \par The patient has a history of psoriasis. However, he reports no symptoms suggestive of skin, joint or ocular extraintestinal manifestations. However, he can experience occasional hip pain.\par \par The patient exercises a lot. He can experience a sense of straining at the umbilicus. However, there is no bulge or obvious hernia. He describes a "tugging feeling" at the umbilicus with a pulling sensation.\par \par The patient indicates on a few occasions he was found to have elevated liver enzymes. He reports no etiology was determined. He indicates this was transient on 2 occasions with subsequent normalization.\par \par CURRENT HISTORY:\par \par ORV 3/28/17:        -Had been doing quite well on regimen of balsalazide 4 tabs p.o. b.i.d. in conjunction with Canasa 1 g daily.\par                 -Change in insurance 1/2017 resulted in discontinuation of Canasa. After one week he experienced flare of colitis-like symptoms. Currently having 4-5 bowel movements daily of varying consistency. Some loose and some formed. Occasional blood observed.\par                -Denies fever, nausea, vomiting or abdominal pain. Appetite fair. Has lost 4 pounds.\par                -Patient still utilizing balsalazide 4 tabs p.o. b.i.d.\par                -Has occasional headaches.\par                 -Increased stress. He has had to move.\par                 -Other than Canasa discontinuation patient reports no other precipitating factors such as antibiotics, travel, diet changes etc.\par \par COLONOSCOPY 7/13/17:    -Total colonoscopy was performed to the cecum with ileoscopy on 7/13/17. Macroscopically the colon was normal except for minimal erythema in the rectum and sigmoid colon. There was no ulcerations, friability or other features of active colitis. Vascular pattern was  preserved. Biopsy of the ascending colon was noted for nonspecific inflammation in the lamina propria. Biopsies of the transverse colon, descending colon, sigmoid colon and rectum revealed colonic mucosa with chronic inflammatory changes characterized by crypt distortion and basal lymphoplasmacytosis and focal activity. Dysplasia was not detected.\par \par ORV 6/29/18:    -Overall, doing well from the standpoint of ulcerative proctitis over the past year. For the most part has been asymptomatic and in remission. However, patient indicates that 3 weeks ago he experienced symptoms lasting one week. For several days he described increased frequency of moving his bowels with up to 12 diarrheal evacuations daily with some blood. Patient suspects dietary factors with increased high-fiber foods may have been provocative. No sick contacts, travel, antibiotics or other precipitating factors reported. Medication regimen was not adjusted during this interval. Symptoms lasted approximately one week and he has since recovered.\par                -Patient currently has 2 formed bowel movements daily without complaints of diarrhea or constipation. Significantly feels better from approximately 3 weeks ago. Might notice some scant blood. However, much improved. Denies fever, nausea, vomiting or abdominal pain.\par               -Medications include balsalazide 4 tabs b.i.d. and Canasa suppository 1 g h.s.\par \par ORV 10/16/19:     -Patient presents for followup regarding history of ulcerative proctosigmoiditis. Dominant clinical features have been mostly ulcerative proctitis. Currently doing well on regimen of balsalazide 4 tabs p.o. b.i.d. in conjunction with mesalamine enema 1 g h.s. Typically has 2 formed bowel movements in the morning and denies current complaints of diarrhea, constipation, change in bowel habit or rectal bleeding. On occasion can have mild constipation.\par \par Telehealth visit 4/20/2020:     -Patient with history of ulcerative colitis (primarily ulcerative proctitis). Disease duration approximately 5-6 years. Currently on regimen of balsalazide 4 tabs p.o. b.i.d. and mesalamine suppository 1 g q.h.s. Doing well at current time. Has one formed bowel movement daily without any current complaints of diarrhea, constipation or rectal bleeding. Previously could have occasional scant rectal bleeding but none recently. Denies fever, nausea, vomiting or abdominal pain. Currently working at home during the coronavirus epidemic. Some increased stress reported.

## 2020-04-20 NOTE — ASSESSMENT
[FreeTextEntry1] : Impression:\par \par #1 ulcerative proctitis- overall doing well on combination therapy with balsalazide 4 tabs p.o. b.i.d. and mesalamine suppository 1 g q.d. History of ulcerative proctitis diagnosis since 2015. Currently stable and in clinical remission.\par \par #2 status post RIH repair\par \par #3 psoriasis.

## 2020-04-20 NOTE — CONSULT LETTER
[Dear  ___] : Dear  [unfilled], [Courtesy Letter:] : I had the pleasure of seeing your patient, [unfilled], in my office today. [Consult Closing:] : Thank you very much for allowing me to participate in the care of this patient.  If you have any questions, please do not hesitate to contact me. [Please see my note below.] : Please see my note below. [Sincerely,] : Sincerely, [FreeTextEntry3] : Rashad Fernandez M.D. [FreeTextEntry2] : Dr. Parish Willingham

## 2020-08-17 RX ORDER — MESALAMINE 1000 MG/1
1000 SUPPOSITORY RECTAL
Qty: 90 | Refills: 3 | Status: ACTIVE | COMMUNITY
Start: 2019-08-15 | End: 1900-01-01

## 2021-02-17 ENCOUNTER — NON-APPOINTMENT (OUTPATIENT)
Age: 36
End: 2021-02-17

## 2021-03-31 ENCOUNTER — NON-APPOINTMENT (OUTPATIENT)
Age: 36
End: 2021-03-31

## 2021-03-31 ENCOUNTER — RX RENEWAL (OUTPATIENT)
Age: 36
End: 2021-03-31

## 2021-05-19 ENCOUNTER — APPOINTMENT (OUTPATIENT)
Dept: CARDIOLOGY | Facility: CLINIC | Age: 36
End: 2021-05-19
Payer: COMMERCIAL

## 2021-05-19 DIAGNOSIS — E55.9 VITAMIN D DEFICIENCY, UNSPECIFIED: ICD-10-CM

## 2021-05-19 DIAGNOSIS — R19.7 DIARRHEA, UNSPECIFIED: ICD-10-CM

## 2021-05-19 DIAGNOSIS — Z00.00 ENCOUNTER FOR GENERAL ADULT MEDICAL EXAMINATION W/OUT ABNORMAL FINDINGS: ICD-10-CM

## 2021-05-19 DIAGNOSIS — E78.00 PURE HYPERCHOLESTEROLEMIA, UNSPECIFIED: ICD-10-CM

## 2021-05-19 PROCEDURE — 99213 OFFICE O/P EST LOW 20 MIN: CPT | Mod: 95

## 2021-05-19 NOTE — REASON FOR VISIT
[FreeTextEntry1] : VIDEO VISIT\par \par TeleHealth Video Encounter:\par \par Initiated by:\par __Patient Call\par _X_Patient Election for TeleHealth visit\par \par Consented for TeleHealth visit\par Patient Location:  Home\par Physician Location:\par _X_Office([address]; [address], [address], [place], [place] [address])\par __Home\par \par Narrative: The patient feels generally well.  He has no new specific complaints.  He denies any chest pains, shortness of breath, or palpitations.  He is treating his colitis which is stable.  He has remained active and is not taking supplements.  He gets no symptoms with his purposeful exercise.  He has had some iron deficiency anemia in the past.  He will be changing jobs and may need blood test for those jobs.  He has not had blood tests in a while.  He is following up with his gastroenterologist about the colitis.  He does not require any prescriptions at the current time.\par \par Assessment: Stable colitis on current medications.  History of iron deficiency anemia.\par \par Plan: Routine blood tests.\par \par \par Follow-up: In 1 year or earlier if needed.\par \par  \par Duration of Encounter:  22  minutes, at least 50% of which was spent in direct counseling and coordination of care\par \par \par \par

## 2021-05-19 NOTE — PHYSICAL EXAM
[General Appearance - Well Developed] : well developed [Normal Appearance] : normal appearance [Well Groomed] : well groomed [General Appearance - Well Nourished] : well nourished [No Deformities] : no deformities [General Appearance - In No Acute Distress] : no acute distress [Normal Conjunctiva] : the conjunctiva exhibited no abnormalities [Eyelids - No Xanthelasma] : the eyelids demonstrated no xanthelasmas [Normal Oral Mucosa] : normal oral mucosa [No Oral Pallor] : no oral pallor [No Oral Cyanosis] : no oral cyanosis [Normal Jugular Venous A Waves Present] : normal jugular venous A waves present [Normal Jugular Venous V Waves Present] : normal jugular venous V waves present [No Jugular Venous Vega A Waves] : no jugular venous vega A waves [Respiration, Rhythm And Depth] : normal respiratory rhythm and effort [Exaggerated Use Of Accessory Muscles For Inspiration] : no accessory muscle use [Edema] : no peripheral edema present [Auscultation Breath Sounds / Voice Sounds] : lungs were clear to auscultation bilaterally [Bowel Sounds] : normal bowel sounds [Abnormal Walk] : normal gait [Gait - Sufficient For Exercise Testing] : the gait was sufficient for exercise testing [Nail Clubbing] : no clubbing of the fingernails [Cyanosis, Localized] : no localized cyanosis [Petechial Hemorrhages (___cm)] : no petechial hemorrhages [] : no ischemic changes [No Xanthoma] : no  xanthoma was observed [Oriented To Time, Place, And Person] : oriented to person, place, and time [Affect] : the affect was normal [No Anxiety] : not feeling anxious [Mood] : the mood was normal [FreeTextEntry1] : psoriatic patches on both elbows

## 2021-07-19 ENCOUNTER — NON-APPOINTMENT (OUTPATIENT)
Age: 36
End: 2021-07-19

## 2021-07-19 ENCOUNTER — RX RENEWAL (OUTPATIENT)
Age: 36
End: 2021-07-19

## 2021-08-27 ENCOUNTER — EMERGENCY (EMERGENCY)
Facility: HOSPITAL | Age: 36
LOS: 1 days | Discharge: HOME | End: 2021-08-27
Attending: EMERGENCY MEDICINE
Payer: COMMERCIAL

## 2021-08-27 VITALS
DIASTOLIC BLOOD PRESSURE: 74 MMHG | SYSTOLIC BLOOD PRESSURE: 117 MMHG | TEMPERATURE: 99 F | RESPIRATION RATE: 18 BRPM | HEART RATE: 66 BPM | OXYGEN SATURATION: 100 %

## 2021-08-27 VITALS
OXYGEN SATURATION: 98 % | HEIGHT: 67 IN | DIASTOLIC BLOOD PRESSURE: 77 MMHG | RESPIRATION RATE: 18 BRPM | SYSTOLIC BLOOD PRESSURE: 118 MMHG | TEMPERATURE: 97 F | HEART RATE: 75 BPM | WEIGHT: 160.06 LBS

## 2021-08-27 LAB
HCT VFR BLD CALC: 36.4 % — LOW (ref 39–50)
HGB BLD-MCNC: 12.1 G/DL — LOW (ref 13–17)
MCHC RBC-ENTMCNC: 28.3 PG — SIGNIFICANT CHANGE UP (ref 27–34)
MCHC RBC-ENTMCNC: 33.2 GM/DL — SIGNIFICANT CHANGE UP (ref 32–36)
MCV RBC AUTO: 85 FL — SIGNIFICANT CHANGE UP (ref 80–100)
NRBC # BLD: 0 /100 WBCS — SIGNIFICANT CHANGE UP (ref 0–0)
PLATELET # BLD AUTO: 294 K/UL — SIGNIFICANT CHANGE UP (ref 150–400)
RAPID RVP RESULT: SIGNIFICANT CHANGE UP
RBC # BLD: 4.28 M/UL — SIGNIFICANT CHANGE UP (ref 4.2–5.8)
RBC # FLD: 12.2 % — SIGNIFICANT CHANGE UP (ref 10.3–14.5)
SARS-COV-2 RNA SPEC QL NAA+PROBE: SIGNIFICANT CHANGE UP
WBC # BLD: 8.7 K/UL — SIGNIFICANT CHANGE UP (ref 3.8–10.5)
WBC # FLD AUTO: 8.7 K/UL — SIGNIFICANT CHANGE UP (ref 3.8–10.5)

## 2021-08-27 PROCEDURE — 93010 ELECTROCARDIOGRAM REPORT: CPT | Mod: NC

## 2021-08-27 PROCEDURE — 71045 X-RAY EXAM CHEST 1 VIEW: CPT

## 2021-08-27 PROCEDURE — 94640 AIRWAY INHALATION TREATMENT: CPT

## 2021-08-27 PROCEDURE — 93005 ELECTROCARDIOGRAM TRACING: CPT

## 2021-08-27 PROCEDURE — 71045 X-RAY EXAM CHEST 1 VIEW: CPT | Mod: 26

## 2021-08-27 PROCEDURE — 85027 COMPLETE CBC AUTOMATED: CPT

## 2021-08-27 PROCEDURE — 0225U NFCT DS DNA&RNA 21 SARSCOV2: CPT

## 2021-08-27 PROCEDURE — 99284 EMERGENCY DEPT VISIT MOD MDM: CPT

## 2021-08-27 PROCEDURE — 99284 EMERGENCY DEPT VISIT MOD MDM: CPT | Mod: 25

## 2021-08-27 RX ORDER — ALBUTEROL 90 UG/1
1 AEROSOL, METERED ORAL ONCE
Refills: 0 | Status: COMPLETED | OUTPATIENT
Start: 2021-08-27 | End: 2021-08-27

## 2021-08-27 RX ADMIN — ALBUTEROL 1 PUFF(S): 90 AEROSOL, METERED ORAL at 15:46

## 2021-08-27 NOTE — ED PROVIDER NOTE - PHYSICAL EXAMINATION
GENERAL: NAD, lying in bed comfortably  HEAD:  Atraumatic, Normocephalic  EYES: EOMI, PERRLA, conjunctiva and sclera clear  ENT: Moist mucous membranes  NECK: Supple, No JVD  CHEST/LUNG: Clear to auscultation bilaterally; No rales, rhonchi, wheezing, or rubs. Unlabored respirations, no chest wall tenderness  HEART: Regular rate and rhythm; No murmurs, rubs, or gallops  ABDOMEN: Bowel sounds present; Soft, Nontender, Nondistended. No hepatomegaly  EXTREMITIES:  2+ Peripheral Pulses, brisk capillary refill. No clubbing, cyanosis, or edema  NERVOUS SYSTEM:  Alert & Oriented X3, speech clear. No deficits   MSK: FROM all 4 extremities, full and equal strength  SKIN: No rashes or lesions

## 2021-08-27 NOTE — ED PROVIDER NOTE - CLINICAL SUMMARY MEDICAL DECISION MAKING FREE TEXT BOX
36M PMH UC, no other cardiac risk factors, p/w atypical/noncardiac CP w/cough, possible viral syndrome/COVID, VSS. Labs, RVP, COVID, XR, reassess.

## 2021-08-27 NOTE — ED PROVIDER NOTE - PATIENT PORTAL LINK FT
You can access the FollowMyHealth Patient Portal offered by [facility] by registering at the following website: [email or web address]. By joining [employer]’s FollowMyHealth portal, you will also be able to view your health information using other applications (apps) compatible with our system.

## 2021-08-27 NOTE — ED PROVIDER NOTE - NSFOLLOWUPINSTRUCTIONS_ED_ALL_ED_FT
You presented to the hospital with chest pain and cough. This is likely due to a viral infection, and not due to your heart. You presented to the hospital with chest pain and cough. This is likely due to a viral infection, and not due to your heart. You were found to be mildly anemic. Please follow-up with your primary care doctor. You will be called with the results of the viral and COVID-19 swabs.

## 2021-08-27 NOTE — ED PROVIDER NOTE - CARE PLAN
1 Principal Discharge DX:	Atypical chest pain   Principal Discharge DX:	Atypical chest pain  Secondary Diagnosis:	Cough

## 2021-08-27 NOTE — ED PROVIDER NOTE - PROGRESS NOTE DETAILS
Attending note (Fredis): labs reviewed, mild anemia hgb 12, patient informed (suspect related to underlying crohn's) and patient to f/u with his private physician regarding cbc results (provided to patient)  covid pcr pending, results can be followed up outpatient.  CXR reviewed, no consolidation, effusion or ptx noted. stable for dc.

## 2021-08-27 NOTE — ED ADULT NURSE NOTE - OBJECTIVE STATEMENT
pt male with mid sternal chest discomfort onset 38 hrs ago with productive cough afebrile fully vaccinated no sob skin warm dry EKG NSR pt bilateral clear breath sounds placed on isolation on arrival denies shortness of breath motor sensory intact pt pending md evaluation

## 2021-08-27 NOTE — ED PROVIDER NOTE - OBJECTIVE STATEMENT
36M PMH UC, psoriasis p/w 36M PMH UC, psoriasis p/w chest pain x 2 days. Pt endorses recent seasonal allergies which have caused him to have runny nose and cough x 1 month, but for past 3 days, cough has been worse and has become productive of white/yellow sputum. +Associated central, sharp, nonradiating chest pain worse with inspiration and mildly worse with exertion but does not improve with rest. Denies SOB, fever, chills, travel. S/p both COVID vaccines and neg for COVID last week. 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

## 2021-08-27 NOTE — ED PROVIDER NOTE - NS ED ROS FT
REVIEW OF SYSTEMS:  [ ] Unable to assess ROS because ______  CONSTITUTIONAL: No fever, chills, night sweats, or fatigue  EYES: No eye pain, visual disturbances, or discharge  ENMT:  No difficulty hearing, tinnitus, vertigo; No sinus or throat pain  NECK: No pain or stiffness  BREASTS: No pain, masses, or nipple discharge  RESPIRATORY: No cough, wheezing, or hemoptysis; No shortness of breath  CARDIOVASCULAR: No chest pain, palpitations, dizziness, or leg swelling  GASTROINTESTINAL: No abdominal or epigastric pain. No nausea, vomiting, or hematemesis; No diarrhea or constipation. No melena or hematochezia.  GENITOURINARY: No dysuria, frequency, hematuria, or incontinence  NEUROLOGICAL: No headaches, memory loss, loss of strength, numbness, or tremors  SKIN: No itching, burning, rashes, or lesions   LYMPH NODES: No enlarged glands  ENDOCRINE: No heat or cold intolerance; No hair loss  MUSCULOSKELETAL: No joint pain or swelling; No muscle, back, or extremity pain  PSYCHIATRIC: No depression, anxiety, mood swings, or difficulty sleeping  HEME/LYMPH: No easy bruising, or bleeding gums  ALLERGY AND IMMUNOLOGIC: No hives or eczema    Negative except as per HPI

## 2021-08-27 NOTE — ED PROVIDER NOTE - ATTENDING CONTRIBUTION TO CARE
Patient is a 37 yo M with history of ulcerative colitis, psoriasis here for cough and associated chest pain x 2 days. He states he has a chronic cough with his allergies but is concerned he may have been exposed to COVID through work. He states pain is worse with breathing. No history of DVT/PE. No leg swelling. No fevers, chills, nausea, vomiting or diarrhea. He is vaccinated for COVID but has an infant at home and wants to be careful. Symptoms are sometimes worse with exertion. Cough is productive and worse than normal.     VS noted  Gen. no acute distress, Non toxic   HEENT: EOMI, mmm  Lungs: CTAB/L no C/ W /R   CVS: RRR   Abd; Soft non tender, non distended. no CVA tenderness  Ext: no edema  Skin: no rash  Neuro AAOx3 non focal clear speech  a/p: cough/ chest discomfort - ekg wnl. Plan for CXR, RVP, albuterol inhaler. No suspicion for ACS/PE.   - Shana CABRAL

## 2021-08-30 ENCOUNTER — TRANSCRIPTION ENCOUNTER (OUTPATIENT)
Age: 36
End: 2021-08-30

## 2021-08-31 PROBLEM — L40.9 PSORIASIS, UNSPECIFIED: Chronic | Status: ACTIVE | Noted: 2021-08-27

## 2021-08-31 PROBLEM — K51.90 ULCERATIVE COLITIS, UNSPECIFIED, WITHOUT COMPLICATIONS: Chronic | Status: ACTIVE | Noted: 2021-08-27

## 2021-09-07 ENCOUNTER — APPOINTMENT (OUTPATIENT)
Dept: PULMONOLOGY | Facility: CLINIC | Age: 36
End: 2021-09-07
Payer: COMMERCIAL

## 2021-09-07 VITALS
HEIGHT: 67 IN | TEMPERATURE: 98 F | HEART RATE: 65 BPM | BODY MASS INDEX: 25.11 KG/M2 | OXYGEN SATURATION: 97 % | DIASTOLIC BLOOD PRESSURE: 64 MMHG | SYSTOLIC BLOOD PRESSURE: 133 MMHG | WEIGHT: 160 LBS

## 2021-09-07 DIAGNOSIS — Z01.812 ENCOUNTER FOR PREPROCEDURAL LABORATORY EXAMINATION: ICD-10-CM

## 2021-09-07 DIAGNOSIS — Z20.822 ENCOUNTER FOR PREPROCEDURAL LABORATORY EXAMINATION: ICD-10-CM

## 2021-09-07 DIAGNOSIS — R05 COUGH: ICD-10-CM

## 2021-09-07 PROCEDURE — 99214 OFFICE O/P EST MOD 30 MIN: CPT

## 2021-09-07 RX ORDER — MONTELUKAST 10 MG/1
10 TABLET, FILM COATED ORAL DAILY
Qty: 30 | Refills: 2 | Status: ACTIVE | COMMUNITY
Start: 2021-09-07 | End: 1900-01-01

## 2021-09-07 NOTE — ASSESSMENT
[FreeTextEntry1] : possible allergies triggering RAD?\par trial of singulair daily\par cont prn albuterol\par follow up for pft

## 2021-09-07 NOTE — PROCEDURE
[FreeTextEntry1] : Prior pft reviewed--normal\par \par \par Reviewed:\par \par EXAM: XR CHEST PORTABLE URGENT 1V\par \par PROCEDURE DATE: 08/27/2021\par \par INTERPRETATION: EXAMINATION: XR CHEST URGENT\par \par CLINICAL INDICATION: chest pain\par \par TECHNIQUE: Single frontal, portable view of the chest was obtained.\par \par COMPARISON: Chest x-ray 7/5/2017.\par \par FINDINGS:\par The heart is normal in size.\par The lungs are clear.\par There is no pneumothorax or pleural effusion.\par \par IMPRESSION:\par Clear lungs.\par \par --- End of Report ---

## 2021-09-07 NOTE — HISTORY OF PRESENT ILLNESS
[TextBox_4] : beginning of summer having cough and throat tickle\par some sob\par seems to happen every summer\par 2 weeks ago sob, went to ed, given albuterol and felt better, using about twice daily

## 2021-10-04 ENCOUNTER — APPOINTMENT (OUTPATIENT)
Dept: GASTROENTEROLOGY | Facility: CLINIC | Age: 36
End: 2021-10-04
Payer: COMMERCIAL

## 2021-10-04 VITALS
WEIGHT: 166 LBS | HEART RATE: 64 BPM | BODY MASS INDEX: 26.06 KG/M2 | TEMPERATURE: 98.8 F | DIASTOLIC BLOOD PRESSURE: 70 MMHG | SYSTOLIC BLOOD PRESSURE: 110 MMHG | OXYGEN SATURATION: 98 % | HEIGHT: 67 IN

## 2021-10-04 PROCEDURE — 99213 OFFICE O/P EST LOW 20 MIN: CPT | Mod: 25

## 2021-10-04 PROCEDURE — 36415 COLL VENOUS BLD VENIPUNCTURE: CPT

## 2021-10-04 NOTE — PHYSICAL EXAM
[General Appearance - Alert] : alert [General Appearance - In No Acute Distress] : in no acute distress [General Appearance - Well Nourished] : well nourished [General Appearance - Well Developed] : well developed [General Appearance - Well-Appearing] : healthy appearing [Sclera] : the sclera and conjunctiva were normal [Neck Appearance] : the appearance of the neck was normal [Neck Cervical Mass (___cm)] : no neck mass was observed [Respiration, Rhythm And Depth] : normal respiratory rhythm and effort [Exaggerated Use Of Accessory Muscles For Inspiration] : no accessory muscle use [Auscultation Breath Sounds / Voice Sounds] : lungs were clear to auscultation bilaterally [Heart Rate And Rhythm] : heart rate was normal and rhythm regular [Heart Sounds] : normal S1 and S2 [Heart Sounds Gallop] : no gallops [Murmurs] : no murmurs [Heart Sounds Pericardial Friction Rub] : no pericardial rub [Edema] : there was no peripheral edema [Bowel Sounds] : normal bowel sounds [Abdomen Soft] : soft [Abdomen Tenderness] : non-tender [] : no hepato-splenomegaly [Abdomen Mass (___ Cm)] : no abdominal mass palpated [Abdomen Hernia] : no hernia was discovered [Cervical Lymph Nodes Enlarged Posterior Bilaterally] : posterior cervical [Cervical Lymph Nodes Enlarged Anterior Bilaterally] : anterior cervical [Supraclavicular Lymph Nodes Enlarged Bilaterally] : supraclavicular [Abnormal Walk] : normal gait [Nail Clubbing] : no clubbing  or cyanosis of the fingernails [Skin Color & Pigmentation] : normal skin color and pigmentation [Oriented To Time, Place, And Person] : oriented to person, place, and time [Impaired Insight] : insight and judgment were intact [Affect] : the affect was normal [Mood] : the mood was normal [FreeTextEntry1] : Pectus excavatum is noted.

## 2021-10-04 NOTE — HISTORY OF PRESENT ILLNESS
[FreeTextEntry1] : Office revisit on 10/4/2021.\par \par Patient presents for followup regarding history of ulcerative colitis.\par \par Previously evaluated for office consultation on 8/3/16.\par \par INITIAL CONSULTATION NOTE:\par \par The patient is a 31-year-old male referred for ongoing treatment of ulcerative colitis. The patient's wife was in attendance throughout the interview and examination. The history was from the patient and a review of limited records that were provided. PMD: Dr. Parish Willingham.\par \par Patient indicates onset of GI symptoms in approximately 2009. Experienced complaints of bloating, cramps, diarrhea and abdominal discomfort. Reports undergoing a colonoscopy in 2009 at [facility] by Dr. Steve Dacosta. He reports this diagnostic evaluation was negative. He indicates being evaluated for celiac disease with apparent negative results. He does not recall if he underwent a colonoscopy.\par \par Several years ago he underwent evaluation by Dr. Parish Holden of colorectal surgery. It is not clear if a sigmoidoscopy was performed. The patient was experiencing rectal bleeding at that time. The patient indicates being diagnosed with possibly hemorrhoids or an anal fissure.\par \par The patient moved to [place] in 8/2015. His symptoms of abdominal gaseous discomfort, diarrhea and rectal bleeding persisted. He was experiencing approximately 6-7 bowel movements especially in the morning. Some movements were small volume and just passage of blood. He was ultimately evaluated by the [facility] in [place]. He was evaluated initially by colorectal surgery and subsequently by gastroenterology.\par \par A colonoscopy was performed by Dr. Raheel Phillips on 9/17/15 revealing localized mild inflammation in the rectum. Examination was otherwise normal. The pathology report regarding this is not available.\par \par Flexible sigmoidoscopy was performed on 12/15/15 revealing inflammatory changes in the rectum. Proctitis was diagnosed.\par \par In 12/2015 the patient was hospitalized for one day at the [facility] facility in [place]. He indicates being diagnosed with ulcerative colitis. 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 Balsalazide 3 tabs p.o. t.i.d. was prescribed. Of note, the patient misunderstood the directions and only took one tab t.i.d. Canasa suppository was prescribed for one to 2 months. He was treated by Dr. April Villalpando of gastroenterology.\par \par Overall, the patient derived a significant improvement in symptoms. However, it is not clear if all of his symptoms resolved and whether he was in a complete remission.\par \par The patient stopped all of the treatment on his own some time in 12/2015 or early 1/2016. He moved back to [place].\par \par In 6/2016 he describes a flare. Diarrhea, abdominal cramps, abdominal gaseous discomfort,bloating and rectal bleeding recurred. Indicates up to 20 "bowel movements" a day although he was experiencing tenesmus and some of these were just small evacuations of blood. There was no fever, nausea or vomiting. He lost 6 pounds.\par \par The patient resumed  balsalazide one tab t.i.d. on his own. He did not resume rectal therapy. He indicates a significant improvement. However, he remains somewhat symptomatic. He has approximately 4-5 bowel movements daily that very in consistency. Occasionally has loose stools. Can still observe some blood.\par \par The patient's appetite and weight are stable. He denies any current complaints of fever, oral ulcers, dysphagia, heartburn, nausea, vomiting or abdominal pain.\par \par The patient denies any history of gallstones or kidney stones.\par \par The patient has a history of psoriasis. However, he reports no symptoms suggestive of skin, joint or ocular extraintestinal manifestations. However, he can experience occasional hip pain.\par \par The patient exercises a lot. He can experience a sense of straining at the umbilicus. However, there is no bulge or obvious hernia. He describes a "tugging feeling" at the umbilicus with a pulling sensation.\par \par The patient indicates on a few occasions he was found to have elevated liver enzymes. He reports no etiology was determined. He indicates this was transient on 2 occasions with subsequent normalization.\par \par CURRENT HISTORY:\par \par ORV 3/28/17:        -Had been doing quite well on regimen of balsalazide 4 tabs p.o. b.i.d. in conjunction with Canasa 1 g daily.\par                 -Change in insurance 1/2017 resulted in discontinuation of Canasa. After one week he experienced flare of colitis-like symptoms. Currently having 4-5 bowel movements daily of varying consistency. Some loose and some formed. Occasional blood observed.\par                -Denies fever, nausea, vomiting or abdominal pain. Appetite fair. Has lost 4 pounds.\par                -Patient still utilizing balsalazide 4 tabs p.o. b.i.d.\par                -Has occasional headaches.\par                 -Increased stress. He has had to move.\par                 -Other than Canasa discontinuation patient reports no other precipitating factors such as antibiotics, travel, diet changes etc.\par \par COLONOSCOPY 7/13/17:    -Total colonoscopy was performed to the cecum with ileoscopy on 7/13/17. Macroscopically the colon was normal except for minimal erythema in the rectum and sigmoid colon. There was no ulcerations, friability or other features of active colitis. Vascular pattern was  preserved. Biopsy of the ascending colon was noted for nonspecific inflammation in the lamina propria. Biopsies of the transverse colon, descending colon, sigmoid colon and rectum revealed colonic mucosa with chronic inflammatory changes characterized by crypt distortion and basal lymphoplasmacytosis and focal activity. Dysplasia was not detected.\par \par ORV 6/29/18:    -Overall, doing well from the standpoint of ulcerative proctitis over the past year. For the most part has been asymptomatic and in remission. However, patient indicates that 3 weeks ago he experienced symptoms lasting one week. For several days he described increased frequency of moving his bowels with up to 12 diarrheal evacuations daily with some blood. Patient suspects dietary factors with increased high-fiber foods may have been provocative. No sick contacts, travel, antibiotics or other precipitating factors reported. Medication regimen was not adjusted during this interval. Symptoms lasted approximately one week and he has since recovered.\par                -Patient currently has 2 formed bowel movements daily without complaints of diarrhea or constipation. Significantly feels better from approximately 3 weeks ago. Might notice some scant blood. However, much improved. Denies fever, nausea, vomiting or abdominal pain.\par               -Medications include balsalazide 4 tabs b.i.d. and Canasa suppository 1 g h.s.\par \par ORV 10/16/19:     -Patient presents for followup regarding history of ulcerative proctosigmoiditis. Dominant clinical features have been mostly ulcerative proctitis. Currently doing well on regimen of balsalazide 4 tabs p.o. b.i.d. in conjunction with mesalamine enema 1 g h.s. Typically has 2 formed bowel movements in the morning and denies current complaints of diarrhea, constipation, change in bowel habit or rectal bleeding. On occasion can have mild constipation.\par \par Telehealth visit 4/20/2020:     -Patient with history of ulcerative colitis (primarily ulcerative proctitis). Disease duration approximately 5-6 years. Currently on regimen of balsalazide 4 tabs p.o. b.i.d. and mesalamine suppository 1 g q.h.s. Doing well at current time. Has one formed bowel movement daily without any current complaints of diarrhea, constipation or rectal bleeding. Previously could have occasional scant rectal bleeding but none recently. Denies fever, nausea, vomiting or abdominal pain. Currently working at home during the coronavirus epidemic. Some increased stress reported.\par \par ORV 10/4/2021:     -Patient presents for follow-up regarding ulcerative colitis (primarily history ulcerative proctitis).  Indicates onset of symptoms 2015.  Currently on maintenance balsalazide 4 tabs p.o. twice daily in conjunction with mesalamine suppository 1000 mg at bedtime.  For the most part over the past year he has done well.  Typically has 1-2 formed [place] 4 bowel movements daily without any diarrhea, constipation or rectal bleeding.  More recently over the past several months he describes having intermittent "flares".  His description of this "flare" is to have episodes of increased frequency of more loose bowel movements.  For approximately 1 to 2 weeks may have 4-5 somewhat loose [place] 6 bowel movements daily but without any blood.  Some associated gaseous discomfort and cramping is reported.  Symptoms may subside only then to recur.  Last episode was 2 weeks ago and currently indicates feeling well at this present time with formed stools and no diarrhea.  Indicates recent increase stress–relatively recent new job, has 11-month-old baby, sleeping poorly, increased job stress etc.\par                                -Had episode of chest pain prompting ER visit and is now being followed by pulmonary.

## 2021-10-04 NOTE — ASSESSMENT
[FreeTextEntry1] : Impression:\par \par #1 ulcerative proctitis-  on combination therapy with balsalazide 4 tabs p.o. b.i.d. and mesalamine suppository 1 g q.d. History of ulcerative proctitis diagnosis since 2015.  Over past year mostly doing well but past few months episodic increased frequency of loose stools.  Possible symptoms attributed to smoldering ulcerative colitis versus alternate etiology of loose stools (dietary etiology, functional etiology etc.).\par \par #2 status post RIH repair\par \par #3 psoriasis.

## 2021-10-04 NOTE — CONSULT LETTER
[Dear  ___] : Dear  [unfilled], [Courtesy Letter:] : I had the pleasure of seeing your patient, [unfilled], in my office today. [Please see my note below.] : Please see my note below. [Consult Closing:] : Thank you very much for allowing me to participate in the care of this patient.  If you have any questions, please do not hesitate to contact me. [Sincerely,] : Sincerely, [FreeTextEntry2] : Dr. Parish Willingham [FreeTextEntry3] : Rashad Fernandez M.D.

## 2021-10-10 LAB
ALBUMIN SERPL ELPH-MCNC: 4.2 G/DL
ALP BLD-CCNC: 96 U/L
ALT SERPL-CCNC: 20 U/L
ANION GAP SERPL CALC-SCNC: 13 MMOL/L
AST SERPL-CCNC: 26 U/L
BASOPHILS # BLD AUTO: 0.09 K/UL
BASOPHILS NFR BLD AUTO: 1.1 %
BILIRUB SERPL-MCNC: 0.3 MG/DL
BUN SERPL-MCNC: 26 MG/DL
CALCIUM SERPL-MCNC: 9.2 MG/DL
CHLORIDE SERPL-SCNC: 106 MMOL/L
CO2 SERPL-SCNC: 23 MMOL/L
CREAT SERPL-MCNC: 1.11 MG/DL
CRP SERPL-MCNC: <3 MG/L
EOSINOPHIL # BLD AUTO: 0.3 K/UL
EOSINOPHIL NFR BLD AUTO: 3.8 %
ERYTHROCYTE [SEDIMENTATION RATE] IN BLOOD BY WESTERGREN METHOD: 61 MM/HR
FERRITIN SERPL-MCNC: 7 NG/ML
GLUCOSE SERPL-MCNC: 90 MG/DL
HBV CORE IGG+IGM SER QL: NONREACTIVE
HBV SURFACE AB SER QL: REACTIVE
HBV SURFACE AG SER QL: NONREACTIVE
HCT VFR BLD CALC: 37.4 %
HGB BLD-MCNC: 12 G/DL
IMM GRANULOCYTES NFR BLD AUTO: 0.3 %
IRON SATN MFR SERPL: 8 %
IRON SERPL-MCNC: 28 UG/DL
LYMPHOCYTES # BLD AUTO: 1.69 K/UL
LYMPHOCYTES NFR BLD AUTO: 21.6 %
M TB IFN-G BLD-IMP: NEGATIVE
MAN DIFF?: NORMAL
MCHC RBC-ENTMCNC: 27.2 PG
MCHC RBC-ENTMCNC: 32.1 GM/DL
MCV RBC AUTO: 84.8 FL
MONOCYTES # BLD AUTO: 0.54 K/UL
MONOCYTES NFR BLD AUTO: 6.9 %
NEUTROPHILS # BLD AUTO: 5.2 K/UL
NEUTROPHILS NFR BLD AUTO: 66.3 %
PLATELET # BLD AUTO: 330 K/UL
POTASSIUM SERPL-SCNC: 4.2 MMOL/L
PROT SERPL-MCNC: 7 G/DL
QUANTIFERON TB PLUS MITOGEN MINUS NIL: 3.7 IU/ML
QUANTIFERON TB PLUS NIL: 0.02 IU/ML
QUANTIFERON TB PLUS TB1 MINUS NIL: 0 IU/ML
QUANTIFERON TB PLUS TB2 MINUS NIL: 0 IU/ML
RBC # BLD: 4.41 M/UL
RBC # FLD: 12.1 %
SODIUM SERPL-SCNC: 141 MMOL/L
TIBC SERPL-MCNC: 371 UG/DL
UIBC SERPL-MCNC: 343 UG/DL
WBC # FLD AUTO: 7.84 K/UL

## 2021-10-17 ENCOUNTER — RX RENEWAL (OUTPATIENT)
Age: 36
End: 2021-10-17

## 2021-10-17 ENCOUNTER — NON-APPOINTMENT (OUTPATIENT)
Age: 36
End: 2021-10-17

## 2021-10-20 ENCOUNTER — APPOINTMENT (OUTPATIENT)
Dept: PULMONOLOGY | Facility: CLINIC | Age: 36
End: 2021-10-20

## 2021-11-05 ENCOUNTER — TRANSCRIPTION ENCOUNTER (OUTPATIENT)
Age: 36
End: 2021-11-05

## 2021-11-23 ENCOUNTER — NON-APPOINTMENT (OUTPATIENT)
Age: 36
End: 2021-11-23

## 2021-11-29 DIAGNOSIS — Z01.818 ENCOUNTER FOR OTHER PREPROCEDURAL EXAMINATION: ICD-10-CM

## 2021-11-30 ENCOUNTER — APPOINTMENT (OUTPATIENT)
Dept: DISASTER EMERGENCY | Facility: CLINIC | Age: 36
End: 2021-11-30

## 2021-12-01 LAB — SARS-COV-2 N GENE NPH QL NAA+PROBE: NOT DETECTED

## 2021-12-02 NOTE — ASU PATIENT PROFILE, ADULT - FALL HARM RISK - UNIVERSAL INTERVENTIONS
Bed in lowest position, wheels locked, appropriate side rails in place/Call bell, personal items and telephone in reach/Instruct patient to call for assistance before getting out of bed or chair/Non-slip footwear when patient is out of bed/[place] to call system/Physically safe environment - no spills, clutter or unnecessary equipment/Purposeful Proactive Rounding/Room/bathroom lighting operational, light cord in reach

## 2021-12-03 ENCOUNTER — RESULT REVIEW (OUTPATIENT)
Age: 36
End: 2021-12-03

## 2021-12-03 ENCOUNTER — APPOINTMENT (OUTPATIENT)
Dept: GASTROENTEROLOGY | Facility: HOSPITAL | Age: 36
End: 2021-12-03

## 2021-12-03 ENCOUNTER — OUTPATIENT (OUTPATIENT)
Dept: OUTPATIENT SERVICES | Facility: HOSPITAL | Age: 36
LOS: 1 days | Discharge: HOME | End: 2021-12-03
Payer: COMMERCIAL

## 2021-12-03 VITALS
TEMPERATURE: 98 F | WEIGHT: 160.06 LBS | OXYGEN SATURATION: 100 % | SYSTOLIC BLOOD PRESSURE: 122 MMHG | HEIGHT: 67 IN | HEART RATE: 55 BPM | RESPIRATION RATE: 16 BRPM | DIASTOLIC BLOOD PRESSURE: 82 MMHG

## 2021-12-03 VITALS
RESPIRATION RATE: 20 BRPM | OXYGEN SATURATION: 100 % | DIASTOLIC BLOOD PRESSURE: 69 MMHG | SYSTOLIC BLOOD PRESSURE: 109 MMHG | HEART RATE: 53 BPM

## 2021-12-03 DIAGNOSIS — K51.90 ULCERATIVE COLITIS, UNSPECIFIED, WITHOUT COMPLICATIONS: ICD-10-CM

## 2021-12-03 PROCEDURE — 45380 COLONOSCOPY AND BIOPSY: CPT

## 2021-12-03 PROCEDURE — 88305 TISSUE EXAM BY PATHOLOGIST: CPT | Mod: 26

## 2021-12-07 ENCOUNTER — NON-APPOINTMENT (OUTPATIENT)
Age: 36
End: 2021-12-07

## 2021-12-07 LAB — SURGICAL PATHOLOGY STUDY: SIGNIFICANT CHANGE UP

## 2021-12-23 ENCOUNTER — TRANSCRIPTION ENCOUNTER (OUTPATIENT)
Age: 36
End: 2021-12-23

## 2022-01-17 ENCOUNTER — RX RENEWAL (OUTPATIENT)
Age: 37
End: 2022-01-17

## 2022-01-17 ENCOUNTER — NON-APPOINTMENT (OUTPATIENT)
Age: 37
End: 2022-01-17

## 2022-01-17 RX ORDER — MESALAMINE 1000 MG/1
1000 SUPPOSITORY RECTAL
Qty: 90 | Refills: 1 | Status: ACTIVE | COMMUNITY
Start: 2019-10-16 | End: 1900-01-01

## 2022-04-20 ENCOUNTER — RX RENEWAL (OUTPATIENT)
Age: 37
End: 2022-04-20

## 2022-04-20 ENCOUNTER — NON-APPOINTMENT (OUTPATIENT)
Age: 37
End: 2022-04-20

## 2022-04-21 ENCOUNTER — TRANSCRIPTION ENCOUNTER (OUTPATIENT)
Age: 37
End: 2022-04-21

## 2022-07-14 ENCOUNTER — RX RENEWAL (OUTPATIENT)
Age: 37
End: 2022-07-14

## 2022-07-14 ENCOUNTER — NON-APPOINTMENT (OUTPATIENT)
Age: 37
End: 2022-07-14

## 2022-09-19 ENCOUNTER — APPOINTMENT (OUTPATIENT)
Dept: GASTROENTEROLOGY | Facility: CLINIC | Age: 37
End: 2022-09-19

## 2022-09-19 VITALS
WEIGHT: 164 LBS | HEART RATE: 73 BPM | OXYGEN SATURATION: 98 % | HEIGHT: 64 IN | DIASTOLIC BLOOD PRESSURE: 80 MMHG | TEMPERATURE: 98.1 F | BODY MASS INDEX: 28 KG/M2 | SYSTOLIC BLOOD PRESSURE: 120 MMHG

## 2022-09-19 DIAGNOSIS — D50.9 IRON DEFICIENCY ANEMIA, UNSPECIFIED: ICD-10-CM

## 2022-09-19 PROCEDURE — 99214 OFFICE O/P EST MOD 30 MIN: CPT

## 2022-09-19 RX ORDER — PEG-3350, SODIUM SULFATE, SODIUM CHLORIDE, POTASSIUM CHLORIDE, SODIUM ASCORBATE AND ASCORBIC ACID 7.5-2.691G
100 KIT ORAL
Qty: 1 | Refills: 0 | Status: ACTIVE | COMMUNITY
Start: 2022-09-19 | End: 1900-01-01

## 2022-09-19 NOTE — ASSESSMENT
[FreeTextEntry1] : Impression:\par \par #1 ulcerative colitis-  \par              - History of ulcerative proctitis diagnosis since 2015.  \par              -Colonoscopy 12/3/2021 noted for features of microscopic inflammation more extensive than previously deemed at prior assessment when diagnosed with ulcerative proctitis.  In addition, area of subtle polypoid nodularity was noted in proximal transverse colon which revealed chronic active colitis but no dysplasia.  Pseudopolyps were noted at splenic flexure and descending colon.  Active macroscopically evident colitis not detected at colonoscopy in 12/3/2021.\par                -Clinically in remission at current time.  No current complaints of diarrhea or rectal bleeding.\par \par #2 history iron deficiency anemia.\par \par #3 status post RIH repair\par \par #4 psoriasis.

## 2022-09-19 NOTE — PHYSICAL EXAM
[Normal] : normal bowel sounds, non-tender, no masses, soft, no no hepato-splenomegaly [Cervical Lymph Nodes Enlarged Posterior Bilaterally] : no posterior cervical lymphadenopathy [Supraclavicular Lymph Nodes Enlarged Bilaterally] : no supraclavicular lymphadenopathy [Cervical Lymph Nodes Enlarged Anterior Bilaterally] : no anterior cervical lymphadenopathy [No CVA Tenderness] : no CVA  tenderness [Abnormal Walk] : normal gait [No Clubbing, Cyanosis] : no clubbing or cyanosis of the fingernails [Normal Color / Pigmentation] : normal skin color and pigmentation [Oriented To Time, Place, And Person] : oriented to person, place, and time [Normal Affect] : the affect was normal [Normal Insight/Judgment] : insight and judgment were intact [Normal Mood] : the mood was normal [de-identified] : Pectus excavatum noted.

## 2022-09-19 NOTE — HISTORY OF PRESENT ILLNESS
[FreeTextEntry1] : Office revisit on 9/19/2022.\par \par Patient presents for followup regarding history of ulcerative colitis.\par \par Previously evaluated for office consultation on 8/3/16.\par \par INITIAL CONSULTATION NOTE:\par \par The patient is a 31-year-old male referred for ongoing treatment of ulcerative colitis. The patient's wife was in attendance throughout the interview and examination. The history was from the patient and a review of limited records that were provided. PMD: Dr. Parish Willingham.\par \par Patient indicates onset of GI symptoms in approximately 2009. Experienced complaints of bloating, cramps, diarrhea and abdominal discomfort. Reports undergoing a colonoscopy in 2009 at [facility] by Dr. Steve Dacosta. He reports this diagnostic evaluation was negative. He indicates being evaluated for celiac disease with apparent negative results. He does not recall if he underwent a colonoscopy.\par \par Several years ago he underwent evaluation by Dr. Parish Holden of colorectal surgery. It is not clear if a sigmoidoscopy was performed. The patient was experiencing rectal bleeding at that time. The patient indicates being diagnosed with possibly hemorrhoids or an anal fissure.\par \par The patient moved to [place] in 8/2015. His symptoms of abdominal gaseous discomfort, diarrhea and rectal bleeding persisted. He was experiencing approximately 6-7 bowel movements especially in the morning. Some movements were small volume and just passage of blood. He was ultimately evaluated by the [facility] in [place]. He was evaluated initially by colorectal surgery and subsequently by gastroenterology.\par \par A colonoscopy was performed by Dr. Raheel Phillips on 9/17/15 revealing localized mild inflammation in the rectum. Examination was otherwise normal. The pathology report regarding this is not available.\par \par Flexible sigmoidoscopy was performed on 12/15/15 revealing inflammatory changes in the rectum. Proctitis was diagnosed.\par \par In 12/2015 the patient was hospitalized for one day at the [facility] facility in [place]. He indicates being diagnosed with ulcerative colitis. 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 Balsalazide 3 tabs p.o. t.i.d. was prescribed. Of note, the patient misunderstood the directions and only took one tab t.i.d. Canasa suppository was prescribed for one to 2 months. He was treated by Dr. April Villalpando of gastroenterology.\par \par Overall, the patient derived a significant improvement in symptoms. However, it is not clear if all of his symptoms resolved and whether he was in a complete remission.\par \par The patient stopped all of the treatment on his own some time in 12/2015 or early 1/2016. He moved back to [place].\par \par In 6/2016 he describes a flare. Diarrhea, abdominal cramps, abdominal gaseous discomfort,bloating and rectal bleeding recurred. Indicates up to 20 "bowel movements" a day although he was experiencing tenesmus and some of these were just small evacuations of blood. There was no fever, nausea or vomiting. He lost 6 pounds.\par \par The patient resumed  balsalazide one tab t.i.d. on his own. He did not resume rectal therapy. He indicates a significant improvement. However, he remains somewhat symptomatic. He has approximately 4-5 bowel movements daily that very in consistency. Occasionally has loose stools. Can still observe some blood.\par \par The patient's appetite and weight are stable. He denies any current complaints of fever, oral ulcers, dysphagia, heartburn, nausea, vomiting or abdominal pain.\par \par The patient denies any history of gallstones or kidney stones.\par \par The patient has a history of psoriasis. However, he reports no symptoms suggestive of skin, joint or ocular extraintestinal manifestations. However, he can experience occasional hip pain.\par \par The patient exercises a lot. He can experience a sense of straining at the umbilicus. However, there is no bulge or obvious hernia. He describes a "tugging feeling" at the umbilicus with a pulling sensation.\par \par The patient indicates on a few occasions he was found to have elevated liver enzymes. He reports no etiology was determined. He indicates this was transient on 2 occasions with subsequent normalization.\par \par CURRENT HISTORY:\par \par ORV 3/28/17:        -Had been doing quite well on regimen of balsalazide 4 tabs p.o. b.i.d. in conjunction with Canasa 1 g daily.\par                 -Change in insurance 1/2017 resulted in discontinuation of Canasa. After one week he experienced flare of colitis-like symptoms. Currently having 4-5 bowel movements daily of varying consistency. Some loose and some formed. Occasional blood observed.\par                -Denies fever, nausea, vomiting or abdominal pain. Appetite fair. Has lost 4 pounds.\par                -Patient still utilizing balsalazide 4 tabs p.o. b.i.d.\par                -Has occasional headaches.\par                 -Increased stress. He has had to move.\par                 -Other than Canasa discontinuation patient reports no other precipitating factors such as antibiotics, travel, diet changes etc.\par \par COLONOSCOPY 7/13/17:    -Total colonoscopy was performed to the cecum with ileoscopy on 7/13/17. Macroscopically the colon was normal except for minimal erythema in the rectum and sigmoid colon. There was no ulcerations, friability or other features of active colitis. Vascular pattern was  preserved. Biopsy of the ascending colon was noted for nonspecific inflammation in the lamina propria. Biopsies of the transverse colon, descending colon, sigmoid colon and rectum revealed colonic mucosa with chronic inflammatory changes characterized by crypt distortion and basal lymphoplasmacytosis and focal activity. Dysplasia was not detected.\par \par ORV 6/29/18:    -Overall, doing well from the standpoint of ulcerative proctitis over the past year. For the most part has been asymptomatic and in remission. However, patient indicates that 3 weeks ago he experienced symptoms lasting one week. For several days he described increased frequency of moving his bowels with up to 12 diarrheal evacuations daily with some blood. Patient suspects dietary factors with increased high-fiber foods may have been provocative. No sick contacts, travel, antibiotics or other precipitating factors reported. Medication regimen was not adjusted during this interval. Symptoms lasted approximately one week and he has since recovered.\par                -Patient currently has 2 formed bowel movements daily without complaints of diarrhea or constipation. Significantly feels better from approximately 3 weeks ago. Might notice some scant blood. However, much improved. Denies fever, nausea, vomiting or abdominal pain.\par               -Medications include balsalazide 4 tabs b.i.d. and Canasa suppository 1 g h.s.\par \par ORV 10/16/19:     -Patient presents for followup regarding history of ulcerative proctosigmoiditis. Dominant clinical features have been mostly ulcerative proctitis. Currently doing well on regimen of balsalazide 4 tabs p.o. b.i.d. in conjunction with mesalamine enema 1 g h.s. Typically has 2 formed bowel movements in the morning and denies current complaints of diarrhea, constipation, change in bowel habit or rectal bleeding. On occasion can have mild constipation.\par \par Telehealth visit 4/20/2020:     -Patient with history of ulcerative colitis (primarily ulcerative proctitis). Disease duration approximately 5-6 years. Currently on regimen of balsalazide 4 tabs p.o. b.i.d. and mesalamine suppository 1 g q.h.s. Doing well at current time. Has one formed bowel movement daily without any current complaints of diarrhea, constipation or rectal bleeding. Previously could have occasional scant rectal bleeding but none recently. Denies fever, nausea, vomiting or abdominal pain. Currently working at home during the coronavirus epidemic. Some increased stress reported.\par \par ORV 10/4/2021:     -Patient presents for follow-up regarding ulcerative colitis (primarily history ulcerative proctitis).  Indicates onset of symptoms 2015.  Currently on maintenance balsalazide 4 tabs p.o. twice daily in conjunction with mesalamine suppository 1000 mg at bedtime.  For the most part over the past year he has done well.  Typically has 1-2 formed [place] 4 bowel movements daily without any diarrhea, constipation or rectal bleeding.  More recently over the past several months he describes having intermittent "flares".  His description of this "flare" is to have episodes of increased frequency of more loose bowel movements.  For approximately 1 to 2 weeks may have 4-5 somewhat loose [place] 6 bowel movements daily but without any blood.  Some associated gaseous discomfort and cramping is reported.  Symptoms may subside only then to recur.  Last episode was 2 weeks ago and currently indicates feeling well at this present time with formed stools and no diarrhea.  Indicates recent increase stress–relatively recent new job, has 11-month-old baby, sleeping poorly, increased job stress etc.\par                                -Had episode of chest pain prompting ER visit and is now being followed by pulmonary.\par \par COLONOSCOPY 12/3/2021:     -Surveillance colonoscopy was performed on 12/3/2021. History of ulcerative colitis–prior assessment predominantly noted for ulcerative proctosigmoiditis and mostly a picture of ulcerative proctitis. Total colonoscopy was performed to the cecum with ileoscopy. Normal terminal ileum. An approximate 5 cm long segment of subtle polypoid nodularity was noted in the proximal transverse colon just distal to the hepatic flexure. Biopsy at this site revealed polypoid colonic mucosa with chronic active colitis. No dysplasia at that site. As noted, subtle polypoid nodularity noted possibly reflecting pseudopolyps. Active ulceration not detected. Ascending colon and transverse colon otherwise normal. Biopsy of ascending colon revealed chronic inactive colitis and transverse colon biopsy revealed chronic active colitis. As noted, no features of active macroscopic colitis at those sites. Polyps were noted at the splenic flexure and descending colon that proved to be inflammatory polyps consistent with pseudopolyps. Colonic mucosa otherwise normal. Descending colon, sigmoid colon and and rectum biopsies revealed chronic active colitis. Dysplasia not detected at any site.\par                                                  -Surveillance colonoscopy noted for features of microscopic inflammation with a more extensive distribution than previously detected at prior evaluation with current findings suggesting pancolitis at least at the microscopic level. No overt macroscopic features of colitis other than pseudopolyps as noted. Patient will continue his current regimen of balsalazide 4 tabs p.o. twice daily and will utilize mesalamine suppositories as needed. Surveillance colonoscopy advised in 1 year. \par \par ORV 9/19/2022:     -Evaluated for follow-up regarding ulcerative colitis.  Indicates overall, doing well from ulcerative colitis standpoint on current regimen of balsalazide 4 tabs p.o. twice daily and mesalamine suppository that is utilized on demand.  Mostly has 2 formed bowel movements daily without any complaints of diarrhea.  Episodically can observe scant hematochezia which may last a few days prompting him to resume mesalamine suppositories which she may take for 1 week or so.  Last episode of this was 6 weeks ago.  Appetite and weight are stable.  Reports no complaints of dysphagia, heartburn, nausea, vomiting or abdominal pain.

## 2022-09-19 NOTE — REASON FOR VISIT
[Consultation] : a consultation visit [FreeTextEntry1] : for follow-up evaluation and treatment of ulcerative colitis.

## 2022-09-21 LAB
ALBUMIN SERPL ELPH-MCNC: 4.7 G/DL
ALP BLD-CCNC: 86 U/L
ALT SERPL-CCNC: 30 U/L
ANION GAP SERPL CALC-SCNC: 13 MMOL/L
AST SERPL-CCNC: 34 U/L
BASOPHILS # BLD AUTO: 0.06 K/UL
BASOPHILS NFR BLD AUTO: 1 %
BILIRUB SERPL-MCNC: 0.5 MG/DL
BUN SERPL-MCNC: 23 MG/DL
CALCIUM SERPL-MCNC: 9.8 MG/DL
CHLORIDE SERPL-SCNC: 103 MMOL/L
CO2 SERPL-SCNC: 24 MMOL/L
CREAT SERPL-MCNC: 1.02 MG/DL
EGFR: 97 ML/MIN/1.73M2
EOSINOPHIL # BLD AUTO: 0.04 K/UL
EOSINOPHIL NFR BLD AUTO: 0.6 %
FERRITIN SERPL-MCNC: 36 NG/ML
FOLATE SERPL-MCNC: 8.8 NG/ML
GLUCOSE SERPL-MCNC: 91 MG/DL
HBV CORE IGG+IGM SER QL: NONREACTIVE
HBV SURFACE AB SER QL: REACTIVE
HBV SURFACE AG SER QL: NONREACTIVE
HCT VFR BLD CALC: 46.8 %
HGB BLD-MCNC: 15.1 G/DL
IMM GRANULOCYTES NFR BLD AUTO: 0.3 %
IRON SATN MFR SERPL: 25 %
IRON SERPL-MCNC: 89 UG/DL
LYMPHOCYTES # BLD AUTO: 2.1 K/UL
LYMPHOCYTES NFR BLD AUTO: 33.9 %
M TB IFN-G BLD-IMP: NEGATIVE
MAN DIFF?: NORMAL
MCHC RBC-ENTMCNC: 28.2 PG
MCHC RBC-ENTMCNC: 32.3 GM/DL
MCV RBC AUTO: 87.5 FL
MONOCYTES # BLD AUTO: 0.35 K/UL
MONOCYTES NFR BLD AUTO: 5.7 %
NEUTROPHILS # BLD AUTO: 3.62 K/UL
NEUTROPHILS NFR BLD AUTO: 58.5 %
PLATELET # BLD AUTO: 261 K/UL
POTASSIUM SERPL-SCNC: 4.4 MMOL/L
PROT SERPL-MCNC: 7.6 G/DL
QUANTIFERON TB PLUS MITOGEN MINUS NIL: 2.66 IU/ML
QUANTIFERON TB PLUS NIL: 0.02 IU/ML
QUANTIFERON TB PLUS TB1 MINUS NIL: 0 IU/ML
QUANTIFERON TB PLUS TB2 MINUS NIL: 0 IU/ML
RBC # BLD: 5.35 M/UL
RBC # FLD: 12.1 %
SODIUM SERPL-SCNC: 141 MMOL/L
TIBC SERPL-MCNC: 354 UG/DL
UIBC SERPL-MCNC: 265 UG/DL
VIT B12 SERPL-MCNC: 1190 PG/ML
WBC # FLD AUTO: 6.19 K/UL

## 2022-10-10 ENCOUNTER — APPOINTMENT (OUTPATIENT)
Dept: CARDIOLOGY | Facility: CLINIC | Age: 37
End: 2022-10-10

## 2022-10-13 ENCOUNTER — NON-APPOINTMENT (OUTPATIENT)
Age: 37
End: 2022-10-13

## 2022-10-13 ENCOUNTER — RX RENEWAL (OUTPATIENT)
Age: 37
End: 2022-10-13

## 2022-10-17 RX ORDER — PEG-3350, SODIUM SULFATE, SODIUM CHLORIDE, POTASSIUM CHLORIDE, SODIUM ASCORBATE AND ASCORBIC ACID 7.5-2.691G
100 KIT ORAL
Qty: 1 | Refills: 0 | Status: ACTIVE | COMMUNITY
Start: 2021-10-10 | End: 1900-01-01

## 2022-11-27 ENCOUNTER — NON-APPOINTMENT (OUTPATIENT)
Age: 37
End: 2022-11-27

## 2022-12-14 LAB — SARS-COV-2 N GENE NPH QL NAA+PROBE: NOT DETECTED

## 2022-12-16 ENCOUNTER — OUTPATIENT (OUTPATIENT)
Dept: OUTPATIENT SERVICES | Facility: HOSPITAL | Age: 37
LOS: 1 days | Discharge: HOME | End: 2022-12-16

## 2022-12-16 ENCOUNTER — APPOINTMENT (OUTPATIENT)
Dept: GASTROENTEROLOGY | Facility: HOSPITAL | Age: 37
End: 2022-12-16

## 2022-12-16 ENCOUNTER — RESULT REVIEW (OUTPATIENT)
Age: 37
End: 2022-12-16

## 2022-12-16 VITALS
DIASTOLIC BLOOD PRESSURE: 79 MMHG | HEIGHT: 67 IN | HEART RATE: 60 BPM | SYSTOLIC BLOOD PRESSURE: 128 MMHG | WEIGHT: 160.06 LBS | RESPIRATION RATE: 13 BRPM | OXYGEN SATURATION: 96 % | TEMPERATURE: 98 F

## 2022-12-16 VITALS
RESPIRATION RATE: 18 BRPM | SYSTOLIC BLOOD PRESSURE: 118 MMHG | DIASTOLIC BLOOD PRESSURE: 64 MMHG | HEART RATE: 56 BPM | OXYGEN SATURATION: 100 %

## 2022-12-16 DIAGNOSIS — K51.90 ULCERATIVE COLITIS, UNSPECIFIED, WITHOUT COMPLICATIONS: ICD-10-CM

## 2022-12-16 PROCEDURE — 45380 COLONOSCOPY AND BIOPSY: CPT

## 2022-12-16 PROCEDURE — 88305 TISSUE EXAM BY PATHOLOGIST: CPT | Mod: 26

## 2022-12-16 RX ORDER — SODIUM CHLORIDE 9 MG/ML
1000 INJECTION, SOLUTION INTRAVENOUS
Refills: 0 | Status: DISCONTINUED | OUTPATIENT
Start: 2022-12-16 | End: 2022-12-30

## 2022-12-20 LAB — SURGICAL PATHOLOGY STUDY: SIGNIFICANT CHANGE UP

## 2023-01-10 ENCOUNTER — RX RENEWAL (OUTPATIENT)
Age: 38
End: 2023-01-10

## 2023-03-24 ENCOUNTER — APPOINTMENT (OUTPATIENT)
Dept: GASTROENTEROLOGY | Facility: CLINIC | Age: 38
End: 2023-03-24
Payer: COMMERCIAL

## 2023-03-24 VITALS
DIASTOLIC BLOOD PRESSURE: 68 MMHG | BODY MASS INDEX: 27.31 KG/M2 | OXYGEN SATURATION: 99 % | SYSTOLIC BLOOD PRESSURE: 122 MMHG | HEIGHT: 64 IN | TEMPERATURE: 97.8 F | WEIGHT: 160 LBS | HEART RATE: 60 BPM

## 2023-03-24 PROCEDURE — 99214 OFFICE O/P EST MOD 30 MIN: CPT | Mod: 25

## 2023-03-24 PROCEDURE — 36415 COLL VENOUS BLD VENIPUNCTURE: CPT

## 2023-03-24 NOTE — HISTORY OF PRESENT ILLNESS
[FreeTextEntry1] : Office revisit on 3/24/2023.\par \par Patient presents for followup regarding history of ulcerative colitis.\par \par Previously evaluated for office consultation on 8/3/16.\par \par INITIAL CONSULTATION NOTE:\par \par The patient is a 31-year-old male referred for ongoing treatment of ulcerative colitis. The patient's wife was in attendance throughout the interview and examination. The history was from the patient and a review of limited records that were provided. PMD: Dr. Parish Willingham.\par \par Patient indicates onset of GI symptoms in approximately 2009. Experienced complaints of bloating, cramps, diarrhea and abdominal discomfort. Reports undergoing a colonoscopy in 2009 at [facility] by Dr. Steve Dacosta. He reports this diagnostic evaluation was negative. He indicates being evaluated for celiac disease with apparent negative results. He does not recall if he underwent a colonoscopy.\par \par Several years ago he underwent evaluation by Dr. Parish Holden of colorectal surgery. It is not clear if a sigmoidoscopy was performed. The patient was experiencing rectal bleeding at that time. The patient indicates being diagnosed with possibly hemorrhoids or an anal fissure.\par \par The patient moved to [place] in 8/2015. His symptoms of abdominal gaseous discomfort, diarrhea and rectal bleeding persisted. He was experiencing approximately 6-7 bowel movements especially in the morning. Some movements were small volume and just passage of blood. He was ultimately evaluated by the [facility] in [place]. He was evaluated initially by colorectal surgery and subsequently by gastroenterology.\par \par A colonoscopy was performed by Dr. Raheel Phillips on 9/17/15 revealing localized mild inflammation in the rectum. Examination was otherwise normal. The pathology report regarding this is not available.\par \par Flexible sigmoidoscopy was performed on 12/15/15 revealing inflammatory changes in the rectum. Proctitis was diagnosed.\par \par In 12/2015 the patient was hospitalized for one day at the [facility] facility in [place]. He indicates being diagnosed with ulcerative colitis. 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 Balsalazide 3 tabs p.o. t.i.d. was prescribed. Of note, the patient misunderstood the directions and only took one tab t.i.d. Canasa suppository was prescribed for one to 2 months. He was treated by Dr. April Villalpando of gastroenterology.\par \par Overall, the patient derived a significant improvement in symptoms. However, it is not clear if all of his symptoms resolved and whether he was in a complete remission.\par \par The patient stopped all of the treatment on his own some time in 12/2015 or early 1/2016. He moved back to [place].\par \par In 6/2016 he describes a flare. Diarrhea, abdominal cramps, abdominal gaseous discomfort,bloating and rectal bleeding recurred. Indicates up to 20 "bowel movements" a day although he was experiencing tenesmus and some of these were just small evacuations of blood. There was no fever, nausea or vomiting. He lost 6 pounds.\par \par The patient resumed  balsalazide one tab t.i.d. on his own. He did not resume rectal therapy. He indicates a significant improvement. However, he remains somewhat symptomatic. He has approximately 4-5 bowel movements daily that very in consistency. Occasionally has loose stools. Can still observe some blood.\par \par The patient's appetite and weight are stable. He denies any current complaints of fever, oral ulcers, dysphagia, heartburn, nausea, vomiting or abdominal pain.\par \par The patient denies any history of gallstones or kidney stones.\par \par The patient has a history of psoriasis. However, he reports no symptoms suggestive of skin, joint or ocular extraintestinal manifestations. However, he can experience occasional hip pain.\par \par The patient exercises a lot. He can experience a sense of straining at the umbilicus. However, there is no bulge or obvious hernia. He describes a "tugging feeling" at the umbilicus with a pulling sensation.\par \par The patient indicates on a few occasions he was found to have elevated liver enzymes. He reports no etiology was determined. He indicates this was transient on 2 occasions with subsequent normalization.\par \par CURRENT HISTORY:\par \par ORV 3/28/17:        -Had been doing quite well on regimen of balsalazide 4 tabs p.o. b.i.d. in conjunction with Canasa 1 g daily.\par                 -Change in insurance 1/2017 resulted in discontinuation of Canasa. After one week he experienced flare of colitis-like symptoms. Currently having 4-5 bowel movements daily of varying consistency. Some loose and some formed. Occasional blood observed.\par                -Denies fever, nausea, vomiting or abdominal pain. Appetite fair. Has lost 4 pounds.\par                -Patient still utilizing balsalazide 4 tabs p.o. b.i.d.\par                -Has occasional headaches.\par                 -Increased stress. He has had to move.\par                 -Other than Canasa discontinuation patient reports no other precipitating factors such as antibiotics, travel, diet changes etc.\par \par COLONOSCOPY 7/13/17:    -Total colonoscopy was performed to the cecum with ileoscopy on 7/13/17. Macroscopically the colon was normal except for minimal erythema in the rectum and sigmoid colon. There was no ulcerations, friability or other features of active colitis. Vascular pattern was  preserved. Biopsy of the ascending colon was noted for nonspecific inflammation in the lamina propria. Biopsies of the transverse colon, descending colon, sigmoid colon and rectum revealed colonic mucosa with chronic inflammatory changes characterized by crypt distortion and basal lymphoplasmacytosis and focal activity. Dysplasia was not detected.\par \par ORV 6/29/18:    -Overall, doing well from the standpoint of ulcerative proctitis over the past year. For the most part has been asymptomatic and in remission. However, patient indicates that 3 weeks ago he experienced symptoms lasting one week. For several days he described increased frequency of moving his bowels with up to 12 diarrheal evacuations daily with some blood. Patient suspects dietary factors with increased high-fiber foods may have been provocative. No sick contacts, travel, antibiotics or other precipitating factors reported. Medication regimen was not adjusted during this interval. Symptoms lasted approximately one week and he has since recovered.\par                -Patient currently has 2 formed bowel movements daily without complaints of diarrhea or constipation. Significantly feels better from approximately 3 weeks ago. Might notice some scant blood. However, much improved. Denies fever, nausea, vomiting or abdominal pain.\par               -Medications include balsalazide 4 tabs b.i.d. and Canasa suppository 1 g h.s.\par \par ORV 10/16/19:     -Patient presents for followup regarding history of ulcerative proctosigmoiditis. Dominant clinical features have been mostly ulcerative proctitis. Currently doing well on regimen of balsalazide 4 tabs p.o. b.i.d. in conjunction with mesalamine enema 1 g h.s. Typically has 2 formed bowel movements in the morning and denies current complaints of diarrhea, constipation, change in bowel habit or rectal bleeding. On occasion can have mild constipation.\par \par Telehealth visit 4/20/2020:     -Patient with history of ulcerative colitis (primarily ulcerative proctitis). Disease duration approximately 5-6 years. Currently on regimen of balsalazide 4 tabs p.o. b.i.d. and mesalamine suppository 1 g q.h.s. Doing well at current time. Has one formed bowel movement daily without any current complaints of diarrhea, constipation or rectal bleeding. Previously could have occasional scant rectal bleeding but none recently. Denies fever, nausea, vomiting or abdominal pain. Currently working at home during the coronavirus epidemic. Some increased stress reported.\par \par ORV 10/4/2021:     -Patient presents for follow-up regarding ulcerative colitis (primarily history ulcerative proctitis).  Indicates onset of symptoms 2015.  Currently on maintenance balsalazide 4 tabs p.o. twice daily in conjunction with mesalamine suppository 1000 mg at bedtime.  For the most part over the past year he has done well.  Typically has 1-2 formed [place] 4 bowel movements daily without any diarrhea, constipation or rectal bleeding.  More recently over the past several months he describes having intermittent "flares".  His description of this "flare" is to have episodes of increased frequency of more loose bowel movements.  For approximately 1 to 2 weeks may have 4-5 somewhat loose [place] 6 bowel movements daily but without any blood.  Some associated gaseous discomfort and cramping is reported.  Symptoms may subside only then to recur.  Last episode was 2 weeks ago and currently indicates feeling well at this present time with formed stools and no diarrhea.  Indicates recent increase stress–relatively recent new job, has 11-month-old baby, sleeping poorly, increased job stress etc.\par                                -Had episode of chest pain prompting ER visit and is now being followed by pulmonary.\par \par COLONOSCOPY 12/3/2021:     -Surveillance colonoscopy was performed on 12/3/2021. History of ulcerative colitis–prior assessment predominantly noted for ulcerative proctosigmoiditis and mostly a picture of ulcerative proctitis. Total colonoscopy was performed to the cecum with ileoscopy. Normal terminal ileum. An approximate 5 cm long segment of subtle polypoid nodularity was noted in the proximal transverse colon just distal to the hepatic flexure. Biopsy at this site revealed polypoid colonic mucosa with chronic active colitis. No dysplasia at that site. As noted, subtle polypoid nodularity noted possibly reflecting pseudopolyps. Active ulceration not detected. Ascending colon and transverse colon otherwise normal. Biopsy of ascending colon revealed chronic inactive colitis and transverse colon biopsy revealed chronic active colitis. As noted, no features of active macroscopic colitis at those sites. Polyps were noted at the splenic flexure and descending colon that proved to be inflammatory polyps consistent with pseudopolyps. Colonic mucosa otherwise normal. Descending colon, sigmoid colon and and rectum biopsies revealed chronic active colitis. Dysplasia not detected at any site.\par                                                  -Surveillance colonoscopy noted for features of microscopic inflammation with a more extensive distribution than previously detected at prior evaluation with current findings suggesting pancolitis at least at the microscopic level. No overt macroscopic features of colitis other than pseudopolyps as noted. Patient will continue his current regimen of balsalazide 4 tabs p.o. twice daily and will utilize mesalamine suppositories as needed. Surveillance colonoscopy advised in 1 year. \par \par ORV 9/19/2022:     -Evaluated for follow-up regarding ulcerative colitis.  Indicates overall, doing well from ulcerative colitis standpoint on current regimen of balsalazide 4 tabs p.o. twice daily and mesalamine suppository that is utilized on demand.  Mostly has 2 formed bowel movements daily without any complaints of diarrhea.  Episodically can observe scant hematochezia which may last a few days prompting him to resume mesalamine suppositories which she may take for 1 week or so.  Last episode of this was 6 weeks ago.  Appetite and weight are stable.  Reports no complaints of dysphagia, heartburn, nausea, vomiting or abdominal pain.\par \par COLONOSCOPY 12/16/2022:      -Total colonoscopy was performed to the cecum with ileoscopy.  Normal terminal ileum.  Active proctosigmoiditis noted up to 20 cm with the appearance of diffuse and continuous symmetric edema, erythema, absent vascular markings and superficial ulceration.  Focal blunted vascular markings but not ulceration noted in descending colon.  Colonic mucosa otherwise normal.  Scattered pseudopolyps noted in distal transverse colon and descending colon.  Biopsy of cecum, ascending colon and transverse colon were normal.  Polyps from transverse colon and descending colon were inflammatory polyps consistent with pseudopolyps.  Biopsy of descending colon, sigmoid and rectum revealed chronic active colitis.  Dysplasia not detected at any site.\par \par ORV 3/24/2023:     -Evaluated for follow-up regarding ulcerative colitis.  Indicates feeling well with no current complaints on current regimen of balsalazide 4 tabs twice daily.  Following colonoscopy of 12/16/2022 was prescribed mesalamine suppositories 1 g [place] at bedtime.  Took these for several weeks and tapered and stopped on his own.  Currently has 2 formed [place] 4 bowel movements daily without any current complaints of diarrhea or rectal bleeding.  Reports no change to appetite or weight.  Reports no complaints of dysphagia, heartburn, nausea, vomiting or abdominal pain.  Of note, patient also has psoriasis and has upcoming appointment with dermatologist regarding treatment options.

## 2023-03-24 NOTE — ASSESSMENT
[FreeTextEntry1] : Impression:\par \par #1 ulcerative colitis-  \par              - History of ulcerative proctitis diagnosis since 2015.  \par              -Colonoscopy 12/3/2021 noted for features of microscopic inflammation more extensive than previously deemed at prior assessment when diagnosed with ulcerative proctitis.  In addition, area of subtle polypoid nodularity was noted in proximal transverse colon which revealed chronic active colitis but no dysplasia.  Pseudopolyps were noted at splenic flexure and descending colon.  Active macroscopically evident colitis not detected at colonoscopy in 12/3/2021.\par                -Colonoscopy 12/16/2022 noted for active ulcerative proctosigmoiditis up to 20 cm.  Pseudopolyps noted in transverse colon and descending colon indicating a greater extent of ulcerative colitis.  Microscopic inflammatory changes noted up to descending colon.\par                 -Clinically in remission.  Asymptomatic from GI standpoint at current time.  Uncertainty exists as to whether current symptomatic clinical status reflects resolution of previously noted ulcerative proctosigmoiditis.\par \par #2 history iron deficiency anemia.\par \par #3 status post RIH repair\par \par #4 psoriasis.

## 2023-03-24 NOTE — PHYSICAL EXAM
[Alert] : alert [Normal Voice/Communication] : normal voice/communication [Healthy Appearing] : healthy appearing [No Acute Distress] : no acute distress [Sclera] : the sclera and conjunctiva were normal [Normal Appearance] : the appearance of the neck was normal [No Respiratory Distress] : no respiratory distress [No Acc Muscle Use] : no accessory muscle use [Respiration, Rhythm And Depth] : normal respiratory rhythm and effort [Auscultation Breath Sounds / Voice Sounds] : lungs were clear to auscultation bilaterally [Normal S1, S2] : normal S1 and S2 [Murmurs] : no murmurs [Bowel Sounds] : normal bowel sounds [No Masses] : no abdominal mass palpated [Abdomen Tenderness] : non-tender [Abdomen Soft] : soft [] : no hepatosplenomegaly [Cervical Lymph Nodes Enlarged Posterior Bilaterally] : no posterior cervical lymphadenopathy [Supraclavicular Lymph Nodes Enlarged Bilaterally] : no supraclavicular lymphadenopathy [Cervical Lymph Nodes Enlarged Anterior Bilaterally] : no anterior cervical lymphadenopathy [Abnormal Walk] : normal gait [No Clubbing, Cyanosis] : no clubbing or cyanosis of the fingernails [Oriented To Time, Place, And Person] : oriented to person, place, and time [Normal Color / Pigmentation] : normal skin color and pigmentation [Normal Affect] : the affect was normal [Normal Insight/Judgment] : insight and judgment were intact [Normal Mood] : the mood was normal [de-identified] : Pectus excavatum noted.

## 2023-03-24 NOTE — REASON FOR VISIT
[Follow-up] : a follow-up of an existing diagnosis [FreeTextEntry1] : for evaluation and treatment of ulcerative colitis.

## 2023-03-24 NOTE — CONSULT LETTER
[Dear  ___] : Dear  [unfilled], [Courtesy Letter:] : I had the pleasure of seeing your patient, [unfilled], in my office today. [Consult Closing:] : Thank you very much for allowing me to participate in the care of this patient.  If you have any questions, please do not hesitate to contact me. [Please see my note below.] : Please see my note below. [Sincerely,] : Sincerely, [FreeTextEntry2] : Dr. Parish Willingham [FreeTextEntry3] : Rashad Fernandez M.D.

## 2023-03-25 LAB
ALBUMIN SERPL ELPH-MCNC: 4.9 G/DL
ALP BLD-CCNC: 70 U/L
ALT SERPL-CCNC: 27 U/L
AST SERPL-CCNC: 25 U/L
BASOPHILS # BLD AUTO: 0.08 K/UL
BASOPHILS NFR BLD AUTO: 1 %
BILIRUB DIRECT SERPL-MCNC: 0.1 MG/DL
BILIRUB INDIRECT SERPL-MCNC: 0.2 MG/DL
BILIRUB SERPL-MCNC: 0.3 MG/DL
CREAT SERPL-MCNC: 1.01 MG/DL
CRP SERPL-MCNC: <3 MG/L
EGFR: 98 ML/MIN/1.73M2
EOSINOPHIL # BLD AUTO: 0.1 K/UL
EOSINOPHIL NFR BLD AUTO: 1.3 %
ERYTHROCYTE [SEDIMENTATION RATE] IN BLOOD BY WESTERGREN METHOD: 24 MM/HR
HCT VFR BLD CALC: 48.3 %
HGB BLD-MCNC: 15.8 G/DL
IMM GRANULOCYTES NFR BLD AUTO: 0.1 %
LYMPHOCYTES # BLD AUTO: 2.37 K/UL
LYMPHOCYTES NFR BLD AUTO: 29.7 %
MAN DIFF?: NORMAL
MCHC RBC-ENTMCNC: 28.4 PG
MCHC RBC-ENTMCNC: 32.7 GM/DL
MCV RBC AUTO: 86.9 FL
MONOCYTES # BLD AUTO: 0.48 K/UL
MONOCYTES NFR BLD AUTO: 6 %
NEUTROPHILS # BLD AUTO: 4.94 K/UL
NEUTROPHILS NFR BLD AUTO: 61.9 %
PLATELET # BLD AUTO: 267 K/UL
PROT SERPL-MCNC: 7.6 G/DL
RBC # BLD: 5.56 M/UL
RBC # FLD: 12.2 %
VZV AB TITR SER: POSITIVE
VZV IGG SER IF-ACNC: >4000 INDEX
WBC # FLD AUTO: 7.98 K/UL

## 2023-03-30 ENCOUNTER — APPOINTMENT (OUTPATIENT)
Dept: DERMATOLOGY | Facility: CLINIC | Age: 38
End: 2023-03-30
Payer: COMMERCIAL

## 2023-03-30 PROCEDURE — 99204 OFFICE O/P NEW MOD 45 MIN: CPT

## 2023-04-15 ENCOUNTER — RX RENEWAL (OUTPATIENT)
Age: 38
End: 2023-04-15

## 2023-04-15 ENCOUNTER — NON-APPOINTMENT (OUTPATIENT)
Age: 38
End: 2023-04-15

## 2023-04-19 ENCOUNTER — NON-APPOINTMENT (OUTPATIENT)
Age: 38
End: 2023-04-19

## 2023-07-15 ENCOUNTER — RX RENEWAL (OUTPATIENT)
Age: 38
End: 2023-07-15

## 2023-07-15 ENCOUNTER — NON-APPOINTMENT (OUTPATIENT)
Age: 38
End: 2023-07-15

## 2023-09-20 ENCOUNTER — APPOINTMENT (OUTPATIENT)
Dept: GASTROENTEROLOGY | Facility: CLINIC | Age: 38
End: 2023-09-20
Payer: COMMERCIAL

## 2023-09-20 VITALS
BODY MASS INDEX: 27.31 KG/M2 | DIASTOLIC BLOOD PRESSURE: 68 MMHG | HEIGHT: 64 IN | OXYGEN SATURATION: 98 % | WEIGHT: 160 LBS | HEART RATE: 67 BPM | SYSTOLIC BLOOD PRESSURE: 118 MMHG

## 2023-09-20 DIAGNOSIS — K51.90 ULCERATIVE COLITIS, UNSPECIFIED, W/OUT COMPLICATIONS: ICD-10-CM

## 2023-09-20 PROCEDURE — 36415 COLL VENOUS BLD VENIPUNCTURE: CPT

## 2023-09-20 PROCEDURE — 99213 OFFICE O/P EST LOW 20 MIN: CPT | Mod: 25

## 2023-09-22 LAB
ALBUMIN SERPL ELPH-MCNC: 4.9 G/DL
ALP BLD-CCNC: 63 U/L
ALT SERPL-CCNC: 26 U/L
ANION GAP SERPL CALC-SCNC: 12 MMOL/L
AST SERPL-CCNC: 29 U/L
BILIRUB SERPL-MCNC: 0.4 MG/DL
BUN SERPL-MCNC: 24 MG/DL
CALCIUM SERPL-MCNC: 9.8 MG/DL
CHLORIDE SERPL-SCNC: 100 MMOL/L
CO2 SERPL-SCNC: 28 MMOL/L
CREAT SERPL-MCNC: 1.1 MG/DL
CRP SERPL-MCNC: <3 MG/L
EGFR: 88 ML/MIN/1.73M2
ERYTHROCYTE [SEDIMENTATION RATE] IN BLOOD BY WESTERGREN METHOD: 11 MM/HR
GLUCOSE SERPL-MCNC: 124 MG/DL
HCT VFR BLD CALC: 45.5 %
HGB BLD-MCNC: 15.2 G/DL
MCHC RBC-ENTMCNC: 28.8 PG
MCHC RBC-ENTMCNC: 33.4 GM/DL
MCV RBC AUTO: 86.3 FL
PLATELET # BLD AUTO: 268 K/UL
POTASSIUM SERPL-SCNC: 4.1 MMOL/L
PROT SERPL-MCNC: 7.3 G/DL
RBC # BLD: 5.27 M/UL
RBC # FLD: 12.3 %
SODIUM SERPL-SCNC: 140 MMOL/L
WBC # FLD AUTO: 8.07 K/UL

## 2023-09-25 ENCOUNTER — NON-APPOINTMENT (OUTPATIENT)
Age: 38
End: 2023-09-25

## 2023-09-28 ENCOUNTER — OFFICE (OUTPATIENT)
Dept: URBAN - METROPOLITAN AREA CLINIC 27 | Facility: CLINIC | Age: 38
Setting detail: OPHTHALMOLOGY
End: 2023-09-28
Payer: COMMERCIAL

## 2023-09-28 DIAGNOSIS — H10.9: ICD-10-CM

## 2023-09-28 PROCEDURE — 92002 INTRM OPH EXAM NEW PATIENT: CPT | Performed by: OPHTHALMOLOGY

## 2023-09-28 ASSESSMENT — KERATOMETRY
OD_K1POWER_DIOPTERS: DEFER
METHOD_AUTO_MANUAL: AUTO
OS_K1POWER_DIOPTERS: DEFER

## 2023-09-28 ASSESSMENT — TONOMETRY
OS_IOP_MMHG: 14
OD_IOP_MMHG: 13

## 2023-09-28 ASSESSMENT — REFRACTION_AUTOREFRACTION
OD_SPHERE: DEFER
OS_SPHERE: DEFER

## 2023-09-28 ASSESSMENT — VISUAL ACUITY
OS_BCVA: 20/60-1
OD_BCVA: 20/40-2

## 2023-10-12 ENCOUNTER — RX RENEWAL (OUTPATIENT)
Age: 38
End: 2023-10-12

## 2023-10-12 ENCOUNTER — NON-APPOINTMENT (OUTPATIENT)
Age: 38
End: 2023-10-12

## 2023-11-07 LAB
VZV AB TITR SER: POSITIVE
VZV IGG SER IF-ACNC: 886.3 INDEX

## 2023-11-28 ENCOUNTER — APPOINTMENT (OUTPATIENT)
Dept: DERMATOLOGY | Facility: CLINIC | Age: 38
End: 2023-11-28
Payer: COMMERCIAL

## 2023-11-28 PROCEDURE — 99214 OFFICE O/P EST MOD 30 MIN: CPT

## 2023-11-28 RX ORDER — CLOBETASOL PROPIONATE 0.5 MG/G
0.05 OINTMENT TOPICAL TWICE DAILY
Qty: 1 | Refills: 3 | Status: ACTIVE | COMMUNITY
Start: 2023-11-28 | End: 1900-01-01

## 2023-11-28 RX ORDER — CLOBETASOL PROPIONATE 0.5 MG/ML
0.05 SOLUTION TOPICAL
Qty: 1 | Refills: 5 | Status: ACTIVE | COMMUNITY
Start: 2023-11-28 | End: 1900-01-01

## 2023-12-23 ENCOUNTER — NON-APPOINTMENT (OUTPATIENT)
Age: 38
End: 2023-12-23

## 2023-12-28 ENCOUNTER — NON-APPOINTMENT (OUTPATIENT)
Age: 38
End: 2023-12-28

## 2024-01-10 ENCOUNTER — RX RENEWAL (OUTPATIENT)
Age: 39
End: 2024-01-10

## 2024-01-10 ENCOUNTER — NON-APPOINTMENT (OUTPATIENT)
Age: 39
End: 2024-01-10

## 2024-02-13 ENCOUNTER — APPOINTMENT (OUTPATIENT)
Dept: PULMONOLOGY | Facility: CLINIC | Age: 39
End: 2024-02-13

## 2024-02-14 ENCOUNTER — NON-APPOINTMENT (OUTPATIENT)
Age: 39
End: 2024-02-14

## 2024-03-04 ENCOUNTER — APPOINTMENT (OUTPATIENT)
Dept: DERMATOLOGY | Facility: CLINIC | Age: 39
End: 2024-03-04

## 2024-04-06 ENCOUNTER — NON-APPOINTMENT (OUTPATIENT)
Age: 39
End: 2024-04-06

## 2024-04-07 ENCOUNTER — RX RENEWAL (OUTPATIENT)
Age: 39
End: 2024-04-07

## 2024-04-07 RX ORDER — MESALAMINE 1000 MG/1
1000 SUPPOSITORY RECTAL
Qty: 90 | Refills: 1 | Status: ACTIVE | COMMUNITY
Start: 2020-04-10 | End: 1900-01-01

## 2024-04-07 RX ORDER — BALSALAZIDE DISODIUM 750 MG/1
750 CAPSULE ORAL
Qty: 720 | Refills: 1 | Status: ACTIVE | COMMUNITY
Start: 2019-10-16 | End: 1900-01-01

## 2024-04-08 ENCOUNTER — APPOINTMENT (OUTPATIENT)
Dept: DERMATOLOGY | Facility: CLINIC | Age: 39
End: 2024-04-08
Payer: COMMERCIAL

## 2024-04-08 DIAGNOSIS — L40.9 PSORIASIS, UNSPECIFIED: ICD-10-CM

## 2024-04-08 DIAGNOSIS — L21.9 SEBORRHEIC DERMATITIS, UNSPECIFIED: ICD-10-CM

## 2024-04-08 DIAGNOSIS — Z79.899 OTHER LONG TERM (CURRENT) DRUG THERAPY: ICD-10-CM

## 2024-04-08 PROCEDURE — 99214 OFFICE O/P EST MOD 30 MIN: CPT

## 2024-04-08 RX ORDER — SODIUM SULFATE, POTASSIUM SULFATE AND MAGNESIUM SULFATE 1.6; 3.13; 17.5 G/177ML; G/177ML; G/177ML
17.5-3.13-1.6 SOLUTION ORAL
Qty: 1 | Refills: 0 | Status: ACTIVE | COMMUNITY
Start: 2023-12-04 | End: 1900-01-01

## 2024-04-08 RX ORDER — KETOCONAZOLE 20.5 MG/ML
2 SHAMPOO, SUSPENSION TOPICAL
Qty: 1 | Refills: 10 | Status: ACTIVE | COMMUNITY
Start: 2024-04-08 | End: 1900-01-01

## 2024-04-08 RX ORDER — HYDROCORTISONE 25 MG/G
2.5 OINTMENT TOPICAL
Qty: 1 | Refills: 4 | Status: ACTIVE | COMMUNITY
Start: 2024-04-08 | End: 1900-01-01

## 2024-04-08 RX ORDER — CLOBETASOL PROPIONATE 0.5 MG/G
0.05 OINTMENT TOPICAL TWICE DAILY
Qty: 1 | Refills: 3 | Status: ACTIVE | COMMUNITY
Start: 2023-03-30 | End: 1900-01-01

## 2024-04-08 RX ORDER — ROFLUMILAST 3 MG/G
0.3 CREAM TOPICAL
Qty: 1 | Refills: 3 | Status: ACTIVE | COMMUNITY
Start: 2024-04-08

## 2024-04-16 ENCOUNTER — OUTPATIENT (OUTPATIENT)
Dept: OUTPATIENT SERVICES | Facility: HOSPITAL | Age: 39
LOS: 1 days | Discharge: HOME | End: 2024-04-16
Payer: COMMERCIAL

## 2024-04-16 ENCOUNTER — APPOINTMENT (OUTPATIENT)
Dept: GASTROENTEROLOGY | Facility: HOSPITAL | Age: 39
End: 2024-04-16

## 2024-04-16 ENCOUNTER — RESULT REVIEW (OUTPATIENT)
Age: 39
End: 2024-04-16

## 2024-04-16 VITALS
OXYGEN SATURATION: 100 % | RESPIRATION RATE: 14 BRPM | DIASTOLIC BLOOD PRESSURE: 78 MMHG | SYSTOLIC BLOOD PRESSURE: 120 MMHG | HEART RATE: 58 BPM

## 2024-04-16 VITALS
DIASTOLIC BLOOD PRESSURE: 88 MMHG | SYSTOLIC BLOOD PRESSURE: 127 MMHG | HEART RATE: 62 BPM | HEIGHT: 67 IN | OXYGEN SATURATION: 100 % | RESPIRATION RATE: 14 BRPM | WEIGHT: 164.91 LBS | TEMPERATURE: 97 F

## 2024-04-16 DIAGNOSIS — K51.90 ULCERATIVE COLITIS, UNSPECIFIED, WITHOUT COMPLICATIONS: ICD-10-CM

## 2024-04-16 PROCEDURE — 88305 TISSUE EXAM BY PATHOLOGIST: CPT | Mod: 26

## 2024-04-16 PROCEDURE — 45380 COLONOSCOPY AND BIOPSY: CPT

## 2024-04-23 ENCOUNTER — NON-APPOINTMENT (OUTPATIENT)
Age: 39
End: 2024-04-23

## 2024-04-23 LAB — SURGICAL PATHOLOGY STUDY: SIGNIFICANT CHANGE UP

## 2024-04-24 ENCOUNTER — NON-APPOINTMENT (OUTPATIENT)
Age: 39
End: 2024-04-24

## 2024-04-26 ENCOUNTER — TRANSCRIPTION ENCOUNTER (OUTPATIENT)
Age: 39
End: 2024-04-26

## 2024-08-05 ENCOUNTER — APPOINTMENT (OUTPATIENT)
Dept: DERMATOLOGY | Facility: CLINIC | Age: 39
End: 2024-08-05

## 2024-08-05 PROCEDURE — 99214 OFFICE O/P EST MOD 30 MIN: CPT

## 2024-08-05 NOTE — ASSESSMENT
[FreeTextEntry1] : #Psoriasis without PsA, BSA 10% chronic, flaring -I have discussed the chronic nature and course of this condition -recalcitrant to clobetasol, calcipotriene, phototherapy x 3 months, zoryve -given concomitant UC, discussed that it would be reasonable to consider a biologic that will treat both conditions. discussed this with patient's GI Dr. Fernandez. Will send message regarding starting given current psoriasis and UC flare, it would be reasonable to start with UC dosing. -can continue clobetasol ointment bid to affected areas on weekdays -continue clobetasol soln daily to scalp for up to 2w -Proper medication use and side effects discussed, including cutaneous atrophy, telangiectasias, and striae. Avoid long-term use. -restart calcipotriene cream bid to affected areas on weekends  # Sebopsoriasis, scalp, chronic, flare -continue ketoconazole 2% shampoo to scalp 2-3x/week, leave in for 10 min before rinsing out -clobetasol soln and calcipotriene cream as above  #High risk medication use -9/2022 quant gold negative, repeat pending -Hep B immune (surface Ab reactive, surface Ag, core Ab negative)  RTC 3 mos

## 2024-08-05 NOTE — PHYSICAL EXAM
[Alert] : alert [Oriented x 3] : ~L oriented x 3 [Declined] : declined [FreeTextEntry3] : Focused exam: -scaly pink plaques on the scalp/frontal hairline, bl elbows, forearms, hands, knees, lower legs, back

## 2024-08-05 NOTE — HISTORY OF PRESENT ILLNESS
[FreeTextEntry1] : fu psoriasis [de-identified] : 37yo M presents for follow up, last seen 4/2024 Hx of UC and psoriasis, tried and failed clobetasol, calcipotriene, phototherapy x 3 mos, zoryve Prev discussed with pts GI Dr. Fernandez - discussed Stelara. Had his annual colonoscopy in April. Stable disease at the time but pt reports recently UC has been flaring along with his psoriasis. Has active areas of psoriasis on the elbows, arms, back, lower legs. Never had on the back previously. at  started zoryve, used for at least 1 month, worsened psoriasis denies joint pains or stiffness Pt remains on mesalamine, balsalazide using clobetasol ointment twice daily without improvement Quant gold negative 9/18/22 - had repeat quant gold drawn last week, result is pending

## 2024-08-06 ENCOUNTER — TRANSCRIPTION ENCOUNTER (OUTPATIENT)
Age: 39
End: 2024-08-06

## 2024-08-12 DIAGNOSIS — R19.7 DIARRHEA, UNSPECIFIED: ICD-10-CM

## 2024-08-21 ENCOUNTER — EMERGENCY (EMERGENCY)
Facility: HOSPITAL | Age: 39
LOS: 1 days | Discharge: HOME | End: 2024-08-21
Attending: STUDENT IN AN ORGANIZED HEALTH CARE EDUCATION/TRAINING PROGRAM
Payer: COMMERCIAL

## 2024-08-21 VITALS
SYSTOLIC BLOOD PRESSURE: 126 MMHG | RESPIRATION RATE: 18 BRPM | DIASTOLIC BLOOD PRESSURE: 82 MMHG | OXYGEN SATURATION: 98 % | HEART RATE: 60 BPM | TEMPERATURE: 98 F

## 2024-08-21 VITALS
HEIGHT: 67 IN | SYSTOLIC BLOOD PRESSURE: 150 MMHG | RESPIRATION RATE: 18 BRPM | TEMPERATURE: 98 F | OXYGEN SATURATION: 100 % | DIASTOLIC BLOOD PRESSURE: 103 MMHG | HEART RATE: 72 BPM | WEIGHT: 164.91 LBS

## 2024-08-21 PROCEDURE — 73610 X-RAY EXAM OF ANKLE: CPT

## 2024-08-21 PROCEDURE — 99283 EMERGENCY DEPT VISIT LOW MDM: CPT | Mod: 25

## 2024-08-21 PROCEDURE — 73610 X-RAY EXAM OF ANKLE: CPT | Mod: 26,LT

## 2024-08-21 PROCEDURE — 99285 EMERGENCY DEPT VISIT HI MDM: CPT

## 2024-08-21 RX ORDER — IBUPROFEN 200 MG
600 TABLET ORAL ONCE
Refills: 0 | Status: COMPLETED | OUTPATIENT
Start: 2024-08-21 | End: 2024-08-21

## 2024-08-21 RX ORDER — ACETAMINOPHEN 500 MG
975 TABLET ORAL ONCE
Refills: 0 | Status: COMPLETED | OUTPATIENT
Start: 2024-08-21 | End: 2024-08-21

## 2024-08-21 RX ADMIN — Medication 975 MILLIGRAM(S): at 05:58

## 2024-08-21 NOTE — ED PROVIDER NOTE - OBJECTIVE STATEMENT
Patient is a 39-year-old male presenting for ankle pain.  Patient has had 1 day progressively worsening left ankle pain which initiated while he was playing golf, swing his left knee and felt a twinge in his left ankle that has been gradually getting worse and now has difficulty bearing weight on the left ankle.  Worse with wiggling his toes.  Worse with heat.  Unchanged with Tylenol.  No prior injuries to the ankle, no prior surgeries on the ankle.

## 2024-08-21 NOTE — ED PROVIDER NOTE - PATIENT PORTAL LINK FT
You can access the FollowMyHealth Patient Portal offered by [facility] by registering at the following website: [email or web address]. By joining [facility]’s FollowMyHealth portal, you will also be able to view your health information using other applications (apps) compatible with our system.

## 2024-08-21 NOTE — ED PROVIDER NOTE - EKG/XRAY ADDITIONAL INFORMATION
I, Dr. Kimo Marrero, independently interpreted the : left ankle xray  +swelling, no gross fracture/dislocation

## 2024-08-21 NOTE — ED PROVIDER NOTE - CLINICAL SUMMARY MEDICAL DECISION MAKING FREE TEXT BOX
Patient is a 39-year-old male presenting for ankle pain. With initial vitals within normal limits.  With left ankle pain is gradually worsening, with poor ambulation.  No infectious findings on exam, tenderness posterior to the medial malleolus of the left ankle and pain with dorsiflexion and eversion of the foot.  Likely with ankle sprain, lower likelihood bony injury.  To evaluate x-ray, give analgesia and reassess.  Likely discharge with follow-up.

## 2024-08-21 NOTE — ED ADULT NURSE NOTE - NSFALLUNIVINTERV_ED_ALL_ED
Bed/Stretcher in lowest position, wheels locked, appropriate side rails in place/Call bell, personal items and telephone in reach/Instruct patient to call for assistance before getting out of bed/chair/stretcher/Non-slip footwear applied when patient is off stretcher/[place] to call system/Physically safe environment - no spills, clutter or unnecessary equipment/Purposeful proactive rounding/Room/bathroom lighting operational, light cord in reach

## 2024-08-21 NOTE — ED ADULT TRIAGE NOTE - CHIEF COMPLAINT QUOTE
left ankle pain began Monday but worsening over the week; now cannot bear wt; no known traumas; no swelling

## 2024-08-21 NOTE — ED PROVIDER NOTE - NS ED MD DISPO DISCHARGE CCDA
Heparin, D5 NS + KCl @ 90 ml/hr, Protonix, Zofran
Patient/Caregiver provided printed discharge information.

## 2024-08-21 NOTE — ED ADULT NURSE NOTE - OBJECTIVE STATEMENT
39 y.o A&OX4 w. no significant PMH presents to ED complaining of left ankle pain. Pt states that he had a long weekend of golfing and noticed a slight pain but persisted to go to work and be on his feet. Pt states that by the end of the day today, he was unable to bear weight and in severe pain. Pt states that he tried elevating and icing his ankle and took Tylenol. Pt awoke with severe pain so he came into ED. Pt is unable to get comfortable and states that nothing makes it better. pt is well appearing. Left ankle appears slightly swollen on assessment. Pt is unable to bear weight. Pedal pulses are intact bilaterally.

## 2024-08-21 NOTE — ED PROVIDER NOTE - ATTENDING CONTRIBUTION TO CARE
Patient here with 1 day of progressively worsening left ankle pain and swelling.  Was playing golf earlier.  States he felt a twinge of pain in the left ankle however was still able to ambulate thereafter.  Went out with some friends and was sitting.  States was unable to get up after secondary sharp pain in the left ankle.  Took Tylenol without improvement.  Has been using ice which did not help, used heat which worsened symptoms.  No prior orthopedic procedures on the ankle.  History complicated by colitis as well as psoriasis using topical steroid.    Hemodynamically stable no acute distress, left ankle with tenderness palpation inferior to the medial malleolus no visible bruising.  Pain worsens with great toe extension, ankle E version, ankle plantarflexion.  2+ pedal pulses intact sensation.  No bruising at the forefoot.  Nontender calf.  Intact Achilles tendon.    Suspect ankle sprain, rule out avulsion fracture.  Obtain x-rays dose analgesia.  Summary of presentation, physical exam findings, plan, expected turn around time for diagnostics discussed with patient. Agrees.

## 2024-08-21 NOTE — ED PROVIDER NOTE - PHYSICAL EXAMINATION
CONSTITUTIONAL: awake; in no acute distress.   SKIN: warm, dry  HEAD: Normocephalic; atraumatic.  EYES: no conjunctival injection. no scleral icterus  ENT: No nasal discharge; airway clear.  EXT: L ankle tender posterior to medial malleolus, pain with dorsiflexion and eversion of foot, dorsiflexion of great toe. pulses and cap refill intact.  NEURO: Alert, oriented, grossly unremarkable  PSYCH: Cooperative, appropriate.

## 2024-08-21 NOTE — ED PROVIDER NOTE - NSFOLLOWUPINSTRUCTIONS_ED_ALL_ED_FT
YOU WERE SEEN IN THE ED FOR: left ankle pain    YOUR XRAY TODAY DID NOT SHOW A DISPLACED FRACTURE ON THE PRELIMINARY READ. IF THERE IS A DISCREPANCY, YOU WILL GET A PHONE CALL.    (R)EST  (I)CE (20 mins on area(s) every 2-4 hours for the next 48 hours)  (C)OMPRESS (with an ace wrap/bandage to help reduce swelling; ensure not too tight)  (E)LEVATE (keep the affected area(s) elevated for the next 48 hours to help reduce swelling)     FOR PAIN/FEVER, YOU MAY TAKE TYLENOL (acetaminophen) AND/OR IBUPROFEN (advil or motrin). FOLLOW THE INSTRUCTIONS ON THE LABEL/CONTAINER.    PLEASE FOLLOW UP WITH YOUR PRIVATE PHYSICIAN WITHIN THE NEXT 1 WEEK. BRING COPIES OF YOUR RESULTS.    RETURN TO THE EMERGENCY DEPARTMENT IF YOU EXPERIENCE ANY NEW/CONCERNING/WORSENING SYMPTOMS.

## 2024-08-21 NOTE — ED PROVIDER NOTE - PROGRESS NOTE DETAILS
Attending Kimo Marrero:  xray w/o acute displaced fracture. Discussed w// xray, expectation management. ace wrap and air cast applied. Taught crutches. Strict return precautions with verbal understanding expressed.stable for dc with outpt f/u

## 2024-08-21 NOTE — ED ADULT NURSE REASSESSMENT NOTE - NS ED NURSE REASSESS COMMENT FT1
ace wrap to ankle by md with crutches given pt states has used crutches before pt will f/u with PMD as instructed rest ice elevation encouraged pt verbalized understanding of all follow up instructions

## 2024-10-01 ENCOUNTER — RX RENEWAL (OUTPATIENT)
Age: 39
End: 2024-10-01

## 2024-10-24 ENCOUNTER — APPOINTMENT (OUTPATIENT)
Dept: PEDIATRIC MEDICAL GENETICS | Facility: CLINIC | Age: 39
End: 2024-10-24

## 2024-10-24 PROCEDURE — ZZZZZ: CPT

## 2024-11-04 ENCOUNTER — NON-APPOINTMENT (OUTPATIENT)
Age: 39
End: 2024-11-04

## 2024-11-04 ENCOUNTER — APPOINTMENT (OUTPATIENT)
Dept: DERMATOLOGY | Facility: CLINIC | Age: 39
End: 2024-11-04
Payer: COMMERCIAL

## 2024-11-04 DIAGNOSIS — L40.9 PSORIASIS, UNSPECIFIED: ICD-10-CM

## 2024-11-04 DIAGNOSIS — Z79.899 OTHER LONG TERM (CURRENT) DRUG THERAPY: ICD-10-CM

## 2024-11-04 PROCEDURE — 99214 OFFICE O/P EST MOD 30 MIN: CPT

## 2024-11-04 RX ORDER — TAPINAROF 10 MG/1000MG
1 CREAM TOPICAL
Qty: 1 | Refills: 3 | Status: ACTIVE | COMMUNITY
Start: 2024-11-04 | End: 1900-01-01

## 2024-11-27 ENCOUNTER — NON-APPOINTMENT (OUTPATIENT)
Age: 39
End: 2024-11-27

## 2024-12-02 RX ORDER — BALSALAZIDE DISODIUM 750 MG/1
750 CAPSULE ORAL
Qty: 810 | Refills: 1 | Status: ACTIVE | COMMUNITY
Start: 2024-11-27 | End: 1900-01-01

## 2024-12-09 RX ORDER — ADALIMUMAB-ADAZ 40 MG/.4ML
40 INJECTION, SOLUTION SUBCUTANEOUS
Qty: 6 | Refills: 1 | Status: ACTIVE | COMMUNITY
Start: 2024-12-05 | End: 1900-01-01

## 2024-12-09 RX ORDER — ADALIMUMAB-ADAZ 40 MG/.4ML
40 INJECTION, SOLUTION SUBCUTANEOUS
Qty: 6 | Refills: 0 | Status: ACTIVE | COMMUNITY
Start: 2024-12-05 | End: 1900-01-01

## 2024-12-30 ENCOUNTER — NON-APPOINTMENT (OUTPATIENT)
Age: 39
End: 2024-12-30

## 2024-12-30 ENCOUNTER — RX RENEWAL (OUTPATIENT)
Age: 39
End: 2024-12-30

## 2025-02-06 ENCOUNTER — APPOINTMENT (OUTPATIENT)
Dept: GASTROENTEROLOGY | Facility: CLINIC | Age: 40
End: 2025-02-06
Payer: COMMERCIAL

## 2025-02-06 VITALS
SYSTOLIC BLOOD PRESSURE: 121 MMHG | HEIGHT: 67 IN | BODY MASS INDEX: 25.9 KG/M2 | OXYGEN SATURATION: 99 % | HEART RATE: 76 BPM | WEIGHT: 165 LBS | DIASTOLIC BLOOD PRESSURE: 75 MMHG

## 2025-02-06 DIAGNOSIS — K51.90 ULCERATIVE COLITIS, UNSPECIFIED, W/OUT COMPLICATIONS: ICD-10-CM

## 2025-02-06 PROCEDURE — 99212 OFFICE O/P EST SF 10 MIN: CPT

## 2025-03-25 RX ORDER — BALSALAZIDE DISODIUM 750 MG/1
750 CAPSULE ORAL
Qty: 810 | Refills: 1 | Status: ACTIVE | COMMUNITY
Start: 2025-03-25 | End: 1900-01-01

## 2025-04-07 RX ORDER — BALSALAZIDE DISODIUM 750 MG/1
750 CAPSULE ORAL
Qty: 810 | Refills: 3 | Status: ACTIVE | COMMUNITY
Start: 2025-04-07 | End: 1900-01-01

## 2025-04-16 DIAGNOSIS — K51.90 ULCERATIVE COLITIS, UNSPECIFIED, W/OUT COMPLICATIONS: ICD-10-CM

## 2025-04-16 LAB
ALBUMIN SERPL ELPH-MCNC: 4.3 G/DL
ALP BLD-CCNC: 55 U/L
ALT SERPL-CCNC: 23 U/L
ANION GAP SERPL CALC-SCNC: 11 MMOL/L
AST SERPL-CCNC: 23 U/L
BASOPHILS # BLD AUTO: 0.07 K/UL
BASOPHILS NFR BLD AUTO: 1.2 %
BILIRUB SERPL-MCNC: 0.5 MG/DL
BUN SERPL-MCNC: 16 MG/DL
CALCIUM SERPL-MCNC: 9.6 MG/DL
CHLORIDE SERPL-SCNC: 107 MMOL/L
CO2 SERPL-SCNC: 27 MMOL/L
CREAT SERPL-MCNC: 1.06 MG/DL
CRP SERPL-MCNC: <3 MG/L
EGFRCR SERPLBLD CKD-EPI 2021: 92 ML/MIN/1.73M2
EOSINOPHIL # BLD AUTO: 0.19 K/UL
EOSINOPHIL NFR BLD AUTO: 3.2 %
GLUCOSE SERPL-MCNC: 100 MG/DL
HBV CORE IGG+IGM SER QL: NONREACTIVE
HBV SURFACE AB SER QL: REACTIVE
HBV SURFACE AG SER QL: NONREACTIVE
HCT VFR BLD CALC: 42.1 %
HGB BLD-MCNC: 14 G/DL
IMM GRANULOCYTES NFR BLD AUTO: 0.2 %
LYMPHOCYTES # BLD AUTO: 2.24 K/UL
LYMPHOCYTES NFR BLD AUTO: 37.6 %
MAN DIFF?: NORMAL
MCHC RBC-ENTMCNC: 28.7 PG
MCHC RBC-ENTMCNC: 33.3 G/DL
MCV RBC AUTO: 86.4 FL
MONOCYTES # BLD AUTO: 0.38 K/UL
MONOCYTES NFR BLD AUTO: 6.4 %
NEUTROPHILS # BLD AUTO: 3.06 K/UL
NEUTROPHILS NFR BLD AUTO: 51.4 %
PLATELET # BLD AUTO: 254 K/UL
POTASSIUM SERPL-SCNC: 4.5 MMOL/L
PROT SERPL-MCNC: 6.9 G/DL
RBC # BLD: 4.87 M/UL
RBC # FLD: 12.5 %
SODIUM SERPL-SCNC: 144 MMOL/L
VZV AB TITR SER: POSITIVE
VZV IGG SER IF-ACNC: 26.4 S/CO
WBC # FLD AUTO: 5.95 K/UL

## 2025-04-18 LAB
M TB IFN-G BLD-IMP: NEGATIVE
QUANTIFERON TB PLUS MITOGEN MINUS NIL: >10 IU/ML
QUANTIFERON TB PLUS NIL: 0.08 IU/ML
QUANTIFERON TB PLUS TB1 MINUS NIL: 0 IU/ML
QUANTIFERON TB PLUS TB2 MINUS NIL: 0 IU/ML

## 2025-05-27 ENCOUNTER — NON-APPOINTMENT (OUTPATIENT)
Age: 40
End: 2025-05-27

## 2025-08-07 DIAGNOSIS — K51.90 ULCERATIVE COLITIS, UNSPECIFIED, W/OUT COMPLICATIONS: ICD-10-CM

## 2025-08-20 RX ORDER — ADALIMUMAB-ADBM 40MG/0.4ML
40 KIT SUBCUTANEOUS
Qty: 6 | Refills: 1 | Status: ACTIVE | COMMUNITY
Start: 2025-08-19 | End: 1900-01-01

## (undated) DEVICE — CONTAINER FORMALIN 80ML YELLOW

## (undated) DEVICE — BIOPSY FORCEP COLD DISP

## (undated) DEVICE — TUBING IV SET GRAVITY 3Y 100" MACRO

## (undated) DEVICE — SALIVA EJECTOR (BLUE)

## (undated) DEVICE — DRSG BANDAID 0.75X3"

## (undated) DEVICE — DRSG CURITY GAUZE SPONGE 4 X 4" 12-PLY NON-STERILE

## (undated) DEVICE — SNARE EXACTO COLD 9MMX230CM

## (undated) DEVICE — PACK IV START WITH CHG

## (undated) DEVICE — LUBRICATING JELLY HR ONE SHOT 3G

## (undated) DEVICE — CATH IV SAFE BC 22G X 1" (BLUE)

## (undated) DEVICE — TUBING MEDI-VAC W MAXIGRIP CONNECTORS 1/4"X6'

## (undated) DEVICE — ELCTR ECG CONDUCTIVE ADHESIVE

## (undated) DEVICE — DRSG 2X2

## (undated) DEVICE — TUBING SUCTION NONCONDUCTIVE 6MM X 12FT

## (undated) DEVICE — GOWN LG

## (undated) DEVICE — LINE BREATHE SAMPLNG

## (undated) DEVICE — ELCTR GROUNDING PAD ADULT COVIDIEN

## (undated) DEVICE — POLY TRAP ETRAP

## (undated) DEVICE — BASIN EMESIS 10IN GRADUATED MAUVE

## (undated) DEVICE — BIOPSY FORCEP RADIAL JAW 4 STANDARD WITH NEEDLE